# Patient Record
Sex: FEMALE | Race: WHITE | NOT HISPANIC OR LATINO | Employment: FULL TIME | URBAN - NONMETROPOLITAN AREA
[De-identification: names, ages, dates, MRNs, and addresses within clinical notes are randomized per-mention and may not be internally consistent; named-entity substitution may affect disease eponyms.]

---

## 2017-12-29 ENCOUNTER — TRANSFERRED RECORDS (OUTPATIENT)
Dept: HEALTH INFORMATION MANAGEMENT | Facility: OTHER | Age: 42
End: 2017-12-29

## 2018-06-13 ENCOUNTER — TRANSFERRED RECORDS (OUTPATIENT)
Dept: HEALTH INFORMATION MANAGEMENT | Facility: OTHER | Age: 43
End: 2018-06-13

## 2019-01-11 ENCOUNTER — TRANSFERRED RECORDS (OUTPATIENT)
Dept: HEALTH INFORMATION MANAGEMENT | Facility: OTHER | Age: 44
End: 2019-01-11

## 2019-06-21 ENCOUNTER — OFFICE VISIT (OUTPATIENT)
Dept: FAMILY MEDICINE | Facility: OTHER | Age: 44
End: 2019-06-21
Attending: FAMILY MEDICINE
Payer: COMMERCIAL

## 2019-06-21 VITALS
SYSTOLIC BLOOD PRESSURE: 118 MMHG | HEART RATE: 72 BPM | HEIGHT: 62 IN | TEMPERATURE: 98.5 F | BODY MASS INDEX: 27.6 KG/M2 | DIASTOLIC BLOOD PRESSURE: 80 MMHG | WEIGHT: 150 LBS | RESPIRATION RATE: 14 BRPM

## 2019-06-21 DIAGNOSIS — Z12.39 BREAST CANCER SCREENING, HIGH RISK PATIENT: ICD-10-CM

## 2019-06-21 DIAGNOSIS — Z12.31 ENCOUNTER FOR SCREENING MAMMOGRAM FOR BREAST CANCER: ICD-10-CM

## 2019-06-21 DIAGNOSIS — Z00.00 ROUTINE HISTORY AND PHYSICAL EXAMINATION OF ADULT: Primary | ICD-10-CM

## 2019-06-21 DIAGNOSIS — Z91.89 AT HIGH RISK FOR BREAST CANCER: ICD-10-CM

## 2019-06-21 PROCEDURE — 99396 PREV VISIT EST AGE 40-64: CPT | Performed by: FAMILY MEDICINE

## 2019-06-21 RX ORDER — CHOLECALCIFEROL (VITAMIN D3) 50 MCG
1 TABLET ORAL DAILY
COMMUNITY
Start: 2019-06-21

## 2019-06-21 ASSESSMENT — PAIN SCALES - GENERAL: PAINLEVEL: NO PAIN (0)

## 2019-06-21 ASSESSMENT — MIFFLIN-ST. JEOR: SCORE: 1293.65

## 2019-06-21 NOTE — NURSING NOTE
Patient presents to the clinic today for a px, and to establish care.     Yvonne Gao LPN.................. 6/21/2019 8:38 AM

## 2019-06-21 NOTE — PROGRESS NOTES
Nursing Notes:   Yvonne Gao LPN  6/21/2019  8:49 AM  Signed  Patient presents to the clinic today for a px, and to establish care.   Yvonne Gao LPN.................. 6/21/2019 8:38 AM     ANNUAL PHYSICAL - FEMALE  HPI: Yesy presents for a yearly exam.  Concerns include:  1. L knee/hip clicking/sore with running - stopped training for race and it has resolved.   2.  Discuss high risk for  breast cancer - needs follow up.  Has had a family history of breast cancer in her mom and maternal grandmother.  Mother was found to have breast cancer in her mid-30s; and passed away at age 39.  Grand mother was diagnosed around age 50.  Yesy has had BRCA testing in [facility] - which was negative.  Has been completing yearly mammograms and yearly breast MRIs (staggered, so some type of imaging being completed every 6 months).  Has had one biopsy of the L breast for area of concern, which returned benign.  She is hopeful to follow somewhere more local (even [place]) to reduce driving.  3. Boil?  On lower half of R breast, improved.  Was larger/red/swollen.  No drainage ever.  + tenderness when more swollen.  4. Recently missed a period - prior to this have ALWAYS been regular.   has had a vasectomy for years (but never did the follow up testing).  Does not feel like she is pregnant.    Patient's last menstrual period was 05/10/2019.   Contraception:  with vasectomy  Risk for STI?: No  Last pap: 5/2018; normal  Any hx of abnormal paps:  2015 had ASCUS with neg HPV.  Recheck in 2017 and 2018 were both normal.  FH ofearly CA?: Breast - as young as early 30s.    Cholesterol/DM concerns/screening: Completed within the last 2-3 years all normal.  Tobacco?: No  Calcium intake: No  DEXA: NA  Last mammo: see above.  Last mammogram was 1/11/2019; and last breast MRI was completed 6/13/2018.  A breast MRI will be ordered and a referral to Jeniffer Marshall with general surgery/breast surgery will be  placed.  Colonoscopy: @ 52  Immunizations: Tdap 9/3/2013; receives flu yearly.    Patient Active Problem List   Diagnosis     At high risk for breast cancer       Past Medical History:   Diagnosis Date     Atypical mole     removed from face and L collarbone     Palpitations 2018    SVT on monitoring.  stress, caffiene - improved.         Past Surgical History:   Procedure Laterality Date     BIOPSY OF SKIN LESION      atypical on face and L collarbone     BREAST BIOPSY, RT/LT Left      HERNIA REPAIR, UMBILICAL N/A ~2000       Social History     Socioeconomic History     Marital status:      Spouse name: Not on file     Number of children: Not on file     Years of education: Not on file     Highest education level: Not on file   Occupational History     Not on file   Social Needs     Financial resource strain: Not on file     Food insecurity:     Worry: Not on file     Inability: Not on file     Transportation needs:     Medical: Not on file     Non-medical: Not on file   Tobacco Use     Smoking status: Not on file   Substance and Sexual Activity     Alcohol use: Not on file     Drug use: Not on file     Sexual activity: Not on file   Lifestyle     Physical activity:     Days per week: Not on file     Minutes per session: Not on file     Stress: Not on file   Relationships     Social connections:     Talks on phone: Not on file     Gets together: Not on file     Attends Holiness service: Not on file     Active member of club or organization: Not on file     Attends meetings of clubs or organizations: Not on file     Relationship status: Not on file     Intimate partner violence:     Fear of current or ex partner: Not on file     Emotionally abused: Not on file     Physically abused: Not on file     Forced sexual activity: Not on file   Other Topics Concern     Not on file   Social History Narrative     Not on file       Family History   Problem Relation Age of Onset     Coronary Artery Disease Father          "s/p stent and bipass surgery; started at age 50     No Known Problems Brother         doesn't get medical care often     Current Outpatient Medications   Medication Sig Dispense Refill     vitamin D3 (CHOLECALCIFEROL) 2000 units (50 mcg) tablet Take 1 tablet (2,000 Units) by mouth daily        REVIEW OF SYSTEMS:  Refer to HPI; all other systems reviewed and negative.    PHYSICAL EXAM:  /80   Pulse 72   Temp 98.5  F (36.9  C) (Tympanic)   Resp 14   Ht 1.575 m (5' 2\")   Wt 68 kg (150 lb)   LMP 05/10/2019   Breastfeeding? No   BMI 27.44 kg/m    CONSTITUTIONAL:  Alert, cooperative, NAD.  EYES: No scleral icterus.  PERRLA.  Conjunctiva clear.  ENT/MOUTH: External ears and nose normal.  TMs normal.  Moist mucous membranes. Oropharynx clear.    ENDO: No thyromegaly or thyroid nodules.  LYMPH:  No cervical or supraclavicular LA.    BREASTS: No skin abnormalities, no erythema.  No discrete masses.  No nipple discharge, no axillary, supra- or infraclavicular LA.   CARDIOVASCULAR: Regular, S1, S2.  No S3 or S4.  No murmur/gallop/rub.  No peripheral edema.  RESPIRATORY: CTA bilaterally, no wheezes, rhonchi or rales.  GI: Bowel sounds wnl.  Soft, nontender, non-distended.  No masses or HSM.  No rebound or guarding.  Small (~3cm) midline healed surgical incision just cephalad to umbilicus.  : Vulva: normal, no lesions or discharge  Urethral meatus: normal size and location, no lesions or discharge  Urethra: no tenderness or masses  Bladder: no fullness or tenderness  Vagina: normal appearance, no abnormal discharge, no lesions.  No evidence of cystocele or rectocele.  Cervix: normal appearance, no lesions, no abnormal discharge.  Uterus: normal size and position, mobile, non-tender  Adnexa: nopalpable masses bilaterally. No cervical motion tenderness.  Pap smear obtained: Not due; NO.  MSKEL: Grossly normal ROM.  No clubbing.  INTEGUMENTARY:Warm, dry.  No rash noted on exposed skin.  NEUROLOGIC: Facies symmetric.  " Grossly normal movement and tone.  No tremor.  PSYCHIATRIC: Affect normal.  Speech fluent.      PHQ-2 Score:   PHQ-2 ( 1999 Pfizer) 6/21/2019   Q1: Little interest or pleasure in doing things 0   Q2: Feeling down, depressed or hopeless 0   PHQ-2 Score 0     Reviewed recent labs.    ASSESSMENT/PLAN:  1. Routine history and physical examination of adult    2. Encounter for screening mammogram for breast cancer    3. Breast cancer screening, high risk patient  - MR Breast Bilateral w/o & w Contrast; Future  - GENERAL SURG ADULT REFERRAL    4. At high risk for breast cancer    Relevant cancer screening discussed.    Counseled on healthy diet, Calcium and vitamin D intake, and exercise.    Fatoumata Puga, DO  Family Practice

## 2019-08-27 ENCOUNTER — HOSPITAL ENCOUNTER (OUTPATIENT)
Dept: MRI IMAGING | Facility: OTHER | Age: 44
Discharge: HOME | End: 2019-08-27
Attending: FAMILY MEDICINE | Admitting: FAMILY MEDICINE
Payer: COMMERCIAL

## 2019-08-27 DIAGNOSIS — Z12.39 BREAST CANCER SCREENING, HIGH RISK PATIENT: ICD-10-CM

## 2019-08-27 PROCEDURE — 77049 MRI BREAST C-+ W/CAD BI: CPT

## 2019-08-27 PROCEDURE — A9577 INJ MULTIHANCE: HCPCS | Performed by: FAMILY MEDICINE

## 2019-08-27 PROCEDURE — 25500064 ZZH RX 255 OP 636: Performed by: FAMILY MEDICINE

## 2019-08-27 RX ADMIN — GADOBENATE DIMEGLUMINE 15 ML: 529 INJECTION, SOLUTION INTRAVENOUS at 13:18

## 2019-09-03 ENCOUNTER — OFFICE VISIT (OUTPATIENT)
Dept: SURGERY | Facility: OTHER | Age: 44
End: 2019-09-03
Attending: SURGERY
Payer: COMMERCIAL

## 2019-09-03 VITALS
BODY MASS INDEX: 29.08 KG/M2 | DIASTOLIC BLOOD PRESSURE: 60 MMHG | HEART RATE: 64 BPM | WEIGHT: 158 LBS | TEMPERATURE: 97.3 F | HEIGHT: 62 IN | SYSTOLIC BLOOD PRESSURE: 98 MMHG | RESPIRATION RATE: 16 BRPM

## 2019-09-03 DIAGNOSIS — Z91.89 AT HIGH RISK FOR BREAST CANCER: Primary | ICD-10-CM

## 2019-09-03 DIAGNOSIS — Z80.3 FAMILY HISTORY OF BREAST CANCER IN MOTHER: ICD-10-CM

## 2019-09-03 PROCEDURE — 99203 OFFICE O/P NEW LOW 30 MIN: CPT | Performed by: SURGERY

## 2019-09-03 ASSESSMENT — PAIN SCALES - GENERAL: PAINLEVEL: NO PAIN (0)

## 2019-09-03 ASSESSMENT — MIFFLIN-ST. JEOR: SCORE: 1329.93

## 2019-09-03 NOTE — PROGRESS NOTES
OFFICE CONSULTATION NOTE  Patient Name: Yesy Garg  Address: [address] [place] [address]  Age:44 year old  Sex: female     Primary Care Physician: Dr. Puga    I was requested to see this patient in consultation by Dr. Puga for evaluation of surveillance for breast cancer. A copy of this note will be sent to Dr. Puga.    HPI:   The patient is 44 year old female with significant family history of breast cancer in her mother (Age 34) and her maternal grandmother Age 54 and 66. The patient has been undergoing high risk surveillance because of family history and dense breast tissue. She has been getting MRI and mammograms alternating every 6 months at [facility]. She recently noted a right breast skin cyst or maybe a spider bite. That resolved but the skin in the area is still dark. No  nipple or breast changes. No previous breast cancer. Previous breast biopsy in 2014 with benign findings. Recent MRI done 8/27 with no area of suspicious enhancement-left sided clip from previous biopsy noted.    CONSULTATION ASSESSMENT AND PLAN/RECOMMENDATIONS:   I discussed with the patient the current recommendations for surveillance for breast cancer in high risk patients (> 20% lifetime risk). We discussed that at this time the best evidence we have is for the current plan-alternating mammograms and MRI. She has previously discussed Tamoxifen as a method of risk reduction and isn't interested in that at this time. She will continue with current plan and will call with any concerns prior to June mammograms. She denies further questions at this time.    REVIEW OF SYSTEMS  GENERAL: No fevers or chills. Denies fatigue, recent weight loss.  HEENT: No sinus drainage. No changes with vision or hearing. No difficulty swallowing.   LYMPHATICS:  No swollen nodes in axilla, neck or groin.  CARDIOVASCULAR: Denies chest pain, palpitations and dyspnea on exertion.  PULMONARY: No shortness of breath or cough. No increase in  sputum production.  GI: Denies melena, bright red blood in stools. No hematemesis. No constipation or diarrhea.  : No dysuria or hematuria.  SKIN: No recent rashes or ulcers.   HEMATOLOGY:  No history of easy bruising or bleeding.  ENDOCRINE:  No history of diabetes or thyroid problems.  NEUROLOGY:  No history of seizures or headaches. No motor or sensory changes.  BREAST:  Denies breast pain or changes other than skin cyst or bite as above.    PAST MEDICAL HISTORY    Past Medical History:   Diagnosis Date     Atypical mole     removed from face and L collarbone     Palpitations 2018    SVT on monitoring.  stress, caffiene - improved.       PAST SURGICAL HISTORY    Past Surgical History:   Procedure Laterality Date     BIOPSY OF SKIN LESION      atypical on face and L collarbone     BREAST BIOPSY, RT/LT Left      HERNIA REPAIR, UMBILICAL N/A ~2000     CURRENT MEDS    Current Outpatient Medications   Medication     vitamin D3 (CHOLECALCIFEROL) 2000 units (50 mcg) tablet     No current facility-administered medications for this visit.      ALLERGIES/SENSITIVITIES    Allergies   Allergen Reactions     Augmentin Hives     FAMILY HISTORY    Family History   Problem Relation Age of Onset     Breast Cancer Mother 34     Coronary Artery Disease Father         s/p stent and bipass surgery; started at age 50     No Known Problems Brother         doesn't get medical care often     Breast Cancer Maternal Grandmother 54        and at 66     SOCIAL HISTORY  Social History     Socioeconomic History     Marital status:      Spouse name: None     Number of children: None     Years of education: None     Highest education level: None   Occupational History     None   Social Needs     Financial resource strain: None     Food insecurity:     Worry: None     Inability: None     Transportation needs:     Medical: None     Non-medical: None   Tobacco Use     Smoking status: Former Smoker     Smokeless tobacco: Never Used  "  Substance and Sexual Activity     Alcohol use: Yes     Comment: a couple drinks a week     Drug use: Never     Sexual activity: Yes     Partners: Male   Lifestyle     Physical activity:     Days per week: None     Minutes per session: None     Stress: None   Relationships     Social connections:     Talks on phone: None     Gets together: None     Attends Rastafarian service: None     Active member of club or organization: None     Attends meetings of clubs or organizations: None     Relationship status: None     Intimate partner violence:     Fear of current or ex partner: None     Emotionally abused: None     Physically abused: None     Forced sexual activity: None   Other Topics Concern     None   Social History Narrative     RN in the ICU       The above history was reviewed today, 9/3/2019.  PHYSICAL EXAM  BP 98/60 (BP Location: Right arm, Patient Position: Sitting, Cuff Size: Adult Regular)   Pulse 64   Temp 97.3  F (36.3  C) (Tympanic)   Resp 16   Ht 1.575 m (5' 2\")   Wt 71.7 kg (158 lb)   BMI 28.90 kg/m    Body mass index is 28.9 kg/m .    GENERAL: Healthy appearing patient in no acute distress. Pleasant and cooperative with exam and interview.   HEENT: Head-normocephalic. Eyes-no scleral icterus, pupils equal, round, and reactive to light. Nose-no nasal drainage. No lesions. Mouth-oral mucosa pink and moist, no lesions.  NECK: Supple. No thyroid nodules. Trachea midline.  LYMPHATICS:  No cervical, axillary or supraclavicular adenopathy.  CV: Regular rate and rhythm, no murmurs. No peripheral edema.  LUNGS:  No respiratory distress. Clear bilaterally to auscultation.  ABDOMEN: Non distended. Bowel sounds active. Soft, non-tender, no hepatosplenomegaly or hernias. No peritoneal signs.  SKIN: Pink, warm and dry. No jaundice. No rash.  NEURO:  Cranial nerves II-XII grossly intact. Alert and oriented.  PSYCH: Appropriate mood and affect.  BREAST: Breasts were examined in the seated and supine position. No " mass noted bilaterally. No nipple changes or discharge bilaterally. 1.5 cm area of hyperpigmentation right breast 4 o'clock position. No mass or induration. Appropriate tenderness noted bilateral breasts.    IMAGING/LAB  I personally reviewed patient's recent MRI images and report.

## 2019-09-03 NOTE — PATIENT INSTRUCTIONS
January mammograms, July MRI continue to alternate until further evidence for improved surveillance.

## 2019-09-03 NOTE — PROGRESS NOTES
"  How many children do you have? 2  What age did your menstrual cycle start? 15  How old were you when your first child was born? 21  Did you breast feed? yes  Are you on or have you ever taken any hormone replacement or birth control? Yes, birth control  Do you have a family history of breast cancer? Yes, mom and maternal grandmother  Chuyita Bowie LPN..........9/3/2019  11:19 AM    Chief Complaint   Patient presents with     Consult     follow up bilateral breast MR       Initial BP 98/60 (BP Location: Right arm, Patient Position: Sitting, Cuff Size: Adult Regular)   Pulse 64   Temp 97.3  F (36.3  C) (Tympanic)   Resp 16   Ht 1.575 m (5' 2\")   Wt 71.7 kg (158 lb)   BMI 28.90 kg/m   Estimated body mass index is 28.9 kg/m  as calculated from the following:    Height as of this encounter: 1.575 m (5' 2\").    Weight as of this encounter: 71.7 kg (158 lb).  Medication Reconciliation: complete    Chuyita Bowie LPN    "

## 2019-12-16 ENCOUNTER — HOSPITAL ENCOUNTER (OUTPATIENT)
Dept: GENERAL RADIOLOGY | Facility: OTHER | Age: 44
Discharge: HOME | End: 2019-12-16
Attending: PHYSICIAN ASSISTANT | Admitting: PHYSICIAN ASSISTANT
Payer: COMMERCIAL

## 2019-12-16 ENCOUNTER — OFFICE VISIT (OUTPATIENT)
Dept: FAMILY MEDICINE | Facility: OTHER | Age: 44
End: 2019-12-16
Attending: PHYSICIAN ASSISTANT
Payer: COMMERCIAL

## 2019-12-16 VITALS
HEART RATE: 68 BPM | TEMPERATURE: 97.2 F | BODY MASS INDEX: 29.26 KG/M2 | WEIGHT: 160 LBS | SYSTOLIC BLOOD PRESSURE: 134 MMHG | DIASTOLIC BLOOD PRESSURE: 72 MMHG | RESPIRATION RATE: 16 BRPM

## 2019-12-16 DIAGNOSIS — M79.674 PAIN OF TOE OF RIGHT FOOT: Primary | ICD-10-CM

## 2019-12-16 DIAGNOSIS — M79.674 PAIN OF TOE OF RIGHT FOOT: ICD-10-CM

## 2019-12-16 PROBLEM — R00.2 PALPITATIONS: Status: ACTIVE | Noted: 2018-05-30

## 2019-12-16 PROCEDURE — 99214 OFFICE O/P EST MOD 30 MIN: CPT | Performed by: PHYSICIAN ASSISTANT

## 2019-12-16 PROCEDURE — 73630 X-RAY EXAM OF FOOT: CPT | Mod: RT

## 2019-12-16 ASSESSMENT — PATIENT HEALTH QUESTIONNAIRE - PHQ9
SUM OF ALL RESPONSES TO PHQ QUESTIONS 1-9: 0
5. POOR APPETITE OR OVEREATING: NOT AT ALL

## 2019-12-16 ASSESSMENT — ANXIETY QUESTIONNAIRES
GAD7 TOTAL SCORE: 0
6. BECOMING EASILY ANNOYED OR IRRITABLE: NOT AT ALL
3. WORRYING TOO MUCH ABOUT DIFFERENT THINGS: NOT AT ALL
IF YOU CHECKED OFF ANY PROBLEMS ON THIS QUESTIONNAIRE, HOW DIFFICULT HAVE THESE PROBLEMS MADE IT FOR YOU TO DO YOUR WORK, TAKE CARE OF THINGS AT HOME, OR GET ALONG WITH OTHER PEOPLE: NOT DIFFICULT AT ALL
1. FEELING NERVOUS, ANXIOUS, OR ON EDGE: NOT AT ALL
7. FEELING AFRAID AS IF SOMETHING AWFUL MIGHT HAPPEN: NOT AT ALL
5. BEING SO RESTLESS THAT IT IS HARD TO SIT STILL: NOT AT ALL
2. NOT BEING ABLE TO STOP OR CONTROL WORRYING: NOT AT ALL

## 2019-12-16 ASSESSMENT — PAIN SCALES - GENERAL: PAINLEVEL: SEVERE PAIN (6)

## 2019-12-16 NOTE — PROGRESS NOTES
"Nursing Notes:   Chuyita Royal LPN  12/16/2019  3:40 PM  Signed  Patient presents to the clinic for right foot pain for months, patient denies any trauma at this time.      Chief Complaint   Patient presents with     Musculoskeletal Problem       Initial /72 (BP Location: Right arm, Patient Position: Sitting, Cuff Size: Adult Regular)   Pulse 68   Temp 97.2  F (36.2  C) (Tympanic)   Resp 16   Wt 72.6 kg (160 lb)   LMP 11/27/2019   BMI 29.26 kg/m    Estimated body mass index is 29.26 kg/m  as calculated from the following:    Height as of 9/3/19: 1.575 m (5' 2\").    Weight as of this encounter: 72.6 kg (160 lb).  Medication Reconciliation: complete    Chuyita Royal LPN      HPI:    Yesy Garg is a 44 year old female who presents for right foot pain for months, patient denies any trauma at this time.  Second toe pain aching with movement. Radiates to bottom of foot. No trauma. Worse with walking, especially on hard surfaces. Wearing slippers at home.     Past Medical History:   Diagnosis Date     Atypical mole     removed from face and L collarbone     Palpitations 2018    SVT on monitoring.  stress, caffiene - improved.         Past Surgical History:   Procedure Laterality Date     BIOPSY OF SKIN LESION      atypical on face and L collarbone     BREAST BIOPSY, RT/LT Left      HERNIA REPAIR, UMBILICAL N/A ~2000       Family History   Problem Relation Age of Onset     Breast Cancer Mother 34     Coronary Artery Disease Father         s/p stent and bipass surgery; started at age 50     No Known Problems Brother         doesn't get medical care often     Breast Cancer Maternal Grandmother 54        and at 66       Social History     Tobacco Use     Smoking status: Former Smoker     Smokeless tobacco: Never Used   Substance Use Topics     Alcohol use: Yes     Comment: a couple drinks a week       Current Outpatient Medications   Medication Sig Dispense Refill     vitamin D3 (CHOLECALCIFEROL) 2000 " units (50 mcg) tablet Take 1 tablet (2,000 Units) by mouth daily         Allergies   Allergen Reactions     Augmentin Hives       REVIEW OF SYSTEMS:  Refer to HPI.    EXAM:   Vitals:    /72 (BP Location: Right arm, Patient Position: Sitting, Cuff Size: Adult Regular)   Pulse 68   Temp 97.2  F (36.2  C) (Tympanic)   Resp 16   Wt 72.6 kg (160 lb)   LMP 11/27/2019   BMI 29.26 kg/m      General Appearance: Pleasant, alert, appropriate appearance for age. No acute distress  Musculoskeletal Exam: Second distal metatarsal pain with palpation.  No bruising or swelling appreciated.  Minimal toe pain with flexion and extension.  Foot Exam: Left and right foot: good pedal pulses, no lesions, nail hygiene good.  Skin: no rash or abnormalities  Neurologic Exam: Nonfocal, normal gross motor, tone coordination and no tremor.  Psychiatric Exam: Alert and oriented -appropriate affect.    PHQ Depression Screen  PHQ-9 SCORE 12/16/2019   PHQ-9 Total Score 0       ASSESSMENT AND PLAN:      ICD-10-CM    1. Pain of toe of right foot M79.674 XR Foot Right G/E 3 Views         Completed right foot xray.  I personally reviewed the xray. I found no fracture appreciated upon initial read of xray.  Final read pending by radiology.   Encouraged to take ibuprofen for relief up to 4 times per day.  Encouraged rest and elevation.  Encouraged to use ice or heat 15 minutes at a time several times per day to decrease pain. Return to clinic in 1-2 weeks as necessary for persistent pain. Return to clinic with any change or worsening of symptoms.   Encouraged ankle ABCs.   Encouraged shoes with good arch support.        Patient Instructions   Encouraged to take ibuprofen for relief up to 4 times per day.  Encouraged rest and elevation.  Encouraged to use ice or heat 15 minutes at a time several times per day to decrease pain. Return to clinic in 1-2 weeks as necessary for persistent pain. Return to clinic with any change or worsening of  symptoms.   Encouraged ankle ABCs.   Encouraged shoes with good arch support.        Leny Hull PA-C PA-C..................12/16/2019 8:33 AM

## 2019-12-16 NOTE — PATIENT INSTRUCTIONS
Encouraged to take ibuprofen for relief up to 4 times per day.  Encouraged rest and elevation.  Encouraged to use ice or heat 15 minutes at a time several times per day to decrease pain. Return to clinic in 1-2 weeks as necessary for persistent pain. Return to clinic with any change or worsening of symptoms.   Encouraged ankle ABCs.   Encouraged shoes with good arch support.

## 2019-12-16 NOTE — NURSING NOTE
"Patient presents to the clinic for right foot pain for months, patient denies any trauma at this time.      Chief Complaint   Patient presents with     Musculoskeletal Problem       Initial /72 (BP Location: Right arm, Patient Position: Sitting, Cuff Size: Adult Regular)   Pulse 68   Temp 97.2  F (36.2  C) (Tympanic)   Resp 16   Wt 72.6 kg (160 lb)   LMP 11/27/2019   BMI 29.26 kg/m   Estimated body mass index is 29.26 kg/m  as calculated from the following:    Height as of 9/3/19: 1.575 m (5' 2\").    Weight as of this encounter: 72.6 kg (160 lb).  Medication Reconciliation: complete    Chuyita Royal LPN    "

## 2019-12-17 ASSESSMENT — ANXIETY QUESTIONNAIRES: GAD7 TOTAL SCORE: 0

## 2020-02-03 ENCOUNTER — HOSPITAL ENCOUNTER (EMERGENCY)
Facility: OTHER | Age: 45
Discharge: HOME | End: 2020-02-03
Attending: EMERGENCY MEDICINE | Admitting: EMERGENCY MEDICINE
Payer: COMMERCIAL

## 2020-02-03 ENCOUNTER — APPOINTMENT (OUTPATIENT)
Dept: GENERAL RADIOLOGY | Facility: OTHER | Age: 45
End: 2020-02-03
Attending: EMERGENCY MEDICINE
Payer: COMMERCIAL

## 2020-02-03 VITALS
HEART RATE: 93 BPM | BODY MASS INDEX: 27.6 KG/M2 | WEIGHT: 150 LBS | RESPIRATION RATE: 15 BRPM | HEIGHT: 62 IN | DIASTOLIC BLOOD PRESSURE: 58 MMHG | SYSTOLIC BLOOD PRESSURE: 99 MMHG | OXYGEN SATURATION: 100 % | TEMPERATURE: 98 F

## 2020-02-03 DIAGNOSIS — S82.831A CLOSED FRACTURE OF DISTAL END OF RIGHT FIBULA: ICD-10-CM

## 2020-02-03 DIAGNOSIS — S82.831A CLOSED FRACTURE OF DISTAL END OF RIGHT FIBULA, UNSPECIFIED FRACTURE MORPHOLOGY, INITIAL ENCOUNTER: ICD-10-CM

## 2020-02-03 PROCEDURE — 73610 X-RAY EXAM OF ANKLE: CPT | Mod: RT

## 2020-02-03 PROCEDURE — 99283 EMERGENCY DEPT VISIT LOW MDM: CPT | Mod: Z6 | Performed by: EMERGENCY MEDICINE

## 2020-02-03 PROCEDURE — 99283 EMERGENCY DEPT VISIT LOW MDM: CPT | Performed by: EMERGENCY MEDICINE

## 2020-02-03 RX ORDER — HYDROCODONE BITARTRATE AND ACETAMINOPHEN 5; 325 MG/1; MG/1
1 TABLET ORAL EVERY 6 HOURS PRN
Qty: 18 TABLET | Refills: 0 | Status: SHIPPED | OUTPATIENT
Start: 2020-02-03 | End: 2021-07-14

## 2020-02-03 ASSESSMENT — ENCOUNTER SYMPTOMS
JOINT SWELLING: 1
RESPIRATORY NEGATIVE: 1
PSYCHIATRIC NEGATIVE: 1
CARDIOVASCULAR NEGATIVE: 1
NEUROLOGICAL NEGATIVE: 1
GASTROINTESTINAL NEGATIVE: 1
CONSTITUTIONAL NEGATIVE: 1
EYES NEGATIVE: 1

## 2020-02-03 ASSESSMENT — MIFFLIN-ST. JEOR: SCORE: 1293.65

## 2020-02-03 NOTE — LETTER
[facility]  [address]  [place] [address]  Phone: [phone]  Fax: [phone]    February 3, 2020        Yesy DIANN Nielsen[place]  [address] [place] [address]          To whom it may concern:    RE: Yesybharath Nielsenollum will need some time off of work due to injury.  She should follow-up with orthopedics for work release.    Please contact me for questions or concerns.      Sincerely,        Juan Carlos Michael MD  2/3/2020, 9:40 AM

## 2020-02-03 NOTE — DISCHARGE INSTRUCTIONS
Follow-up with orthopedics later this week.  Use ice elevation and ibuprofen as needed for pain.  Also use Tylenol.

## 2020-02-03 NOTE — ED TRIAGE NOTES
"ED Nursing Triage Note (General)   ________________________________    Yesy Garg is a 44 year old Female that presents to triage private car  With history of  Fall on ice last night injuring her right ankle; unable to bear weight reported by patient   Significant symptoms had onset 12 hour(s) ago.  BP 99/58   Pulse 93   Temp 98  F (36.7  C) (Temporal)   Resp 15   Ht 1.575 m (5' 2\")   Wt 68 kg (150 lb)   SpO2 100%   BMI 27.44 kg/m  t  Patient appears alert , in moderate distress., and cooperative behavior.    GCS Total = 15  Airway: intact  Breathing noted as Normal.  Circulation Normal  Skin normal  Action taken:  Triage order initiated      PRE HOSPITAL PRIOR LIVING SITUATION Spouse  "

## 2020-02-03 NOTE — ED AVS SNAPSHOT
[facility]  [address]  [place] [address]  Phone:  [phone]  Fax:  [phone]                                    Yesy Garg   MRN: [id number]    Department:  [facility]   Date of Visit:  2/3/2020           After Visit Summary Signature Page    I have received my discharge instructions, and my questions have been answered. I have discussed any challenges I see with this plan with the nurse or doctor.    ..........................................................................................................................................  Patient/Patient Representative Signature      ..........................................................................................................................................  Patient Representative Print Name and Relationship to Patient    ..................................................               ................................................  Date                                   Time    ..........................................................................................................................................  Reviewed by Signature/Title    ...................................................              ..............................................  Date                                               Time          22EPIC Rev 08/18

## 2020-02-03 NOTE — ED PROVIDER NOTES
History     Chief Complaint   Patient presents with     Trauma     HPI  Yesy Garg is a 44 year old female who presents to the ED due to fall on ice last night. She said she was walking outside when she fell on her right ankle. She notes she is unable to bear weight and has not bore weight since the accident. She also injured her right hand but did not hit her head. The fall was unwitnessed but she remembers everything and claims she did not black out. She has never fractured that ankle before. She is not on any blood thinners and is only taking vitamins. She has not taken anything for pain.     Allergies:  Allergies   Allergen Reactions     Augmentin Hives       Problem List:    Patient Active Problem List    Diagnosis Date Noted     Closed fracture of distal end of right fibula 02/03/2020     Priority: Medium     At high risk for breast cancer 06/21/2019     Priority: Medium     Palpitations 05/30/2018     Priority: Medium        Past Medical History:    Past Medical History:   Diagnosis Date     Atypical mole      Palpitations 2018       Past Surgical History:    Past Surgical History:   Procedure Laterality Date     BIOPSY OF SKIN LESION      atypical on face and L collarbone     BREAST BIOPSY, RT/LT Left      HERNIA REPAIR, UMBILICAL N/A ~2000       Family History:    Family History   Problem Relation Age of Onset     Breast Cancer Mother 34     Coronary Artery Disease Father         s/p stent and bipass surgery; started at age 50     No Known Problems Brother         doesn't get medical care often     Breast Cancer Maternal Grandmother 54        and at 66       Social History:  Marital Status:   [2]  Social History     Tobacco Use     Smoking status: Former Smoker     Smokeless tobacco: Never Used   Substance Use Topics     Alcohol use: Yes     Comment: a couple drinks a week     Drug use: Never        Medications:    vitamin D3 (CHOLECALCIFEROL) 2000 units (50 mcg) tablet          Review of  "Systems   Constitutional: Negative.    HENT: Negative.    Eyes: Negative.    Respiratory: Negative.    Cardiovascular: Negative.    Gastrointestinal: Negative.    Musculoskeletal: Positive for joint swelling.        Right ankle swelling and pain   Neurological: Negative.    Psychiatric/Behavioral: Negative.        Physical Exam   BP: 99/58  Pulse: 93  Temp: 98  F (36.7  C)  Resp: 15  Height: 157.5 cm (5' 2\")  Weight: 68 kg (150 lb)  SpO2: 100 %      Physical Exam  Constitutional:       Appearance: Normal appearance. She is normal weight.   HENT:      Mouth/Throat:      Mouth: Mucous membranes are moist.      Pharynx: Oropharynx is clear.   Cardiovascular:      Rate and Rhythm: Normal rate and regular rhythm.   Pulmonary:      Effort: Pulmonary effort is normal.      Breath sounds: Normal breath sounds.   Musculoskeletal:         General: Swelling, tenderness and signs of injury present.      Comments: Injury of her right ankle. Edematous around the lateral malleolus. Tender to palpation at lateral malleolus. Is not able to bear weight.      Skin:     General: Skin is warm and dry.      Findings: Bruising present.      Comments: Has a small hematoma on right palm.    Neurological:      General: No focal deficit present.      Mental Status: She is alert.         ED Course        Procedures               Critical Care time:  none               Results for orders placed or performed during the hospital encounter of 02/03/20 (from the past 24 hour(s))   XR Ankle Right G/E 3 Views    Narrative    PROCEDURE: XR ANKLE RT G/E 3 VW 2/3/2020 9:18 AM    HISTORY: fall unable to bear weight    COMPARISONS: None.    TECHNIQUE: 3 views.    FINDINGS: There is an obliquely oriented fracture through the distal  fibula with some cortical angulation seen only along the posterior  margin of the fracture line. No significant displacement is seen.  There is lateral soft tissue swelling.    No medial malleolar fracture is seen and ankle " mortise is congruent.         Impression    IMPRESSION: Distal fibular fracture without significant displacement.    REAGAN BURNS MD     Ankle Xray: Distal fibular fracture without displacement.     Medications - No data to display    Assessments & Plan (with Medical Decision Making)     I have reviewed the nursing notes.    I have reviewed the findings, diagnosis, plan and need for follow up with the patient.         New Prescriptions    No medications on file       Final diagnoses:   Closed fracture of distal end of right fibula     1. Acute fracture of distal right fibula  Patient presents after fall last night. She has been unable to bear weight since the fall. Notes she did not hit her head. Pain is a 4/10, has iced the leg but has not taken any medications. Xray of right ankle showed hairline fracture of distal right fibula.   Plan is to discharge her with cast boot and crutches. Ice ankle, rest and NSAIDs or tylenol as needed. Follow up with orthopedics in a few days.     Patsy Douglas MS3, [employer] student   Working under the supervision of Dr. Edwar Michael MD      2/3/2020   [facility]      Note started by MS3 [employer],  Irevised, edited and addended note as needed.  In addition patient was seen and examined by myself including both history and physical examination. My findings are reflected in the note above.           Juan Carlos Michael MD  02/03/20 [id number]

## 2020-02-06 ENCOUNTER — OFFICE VISIT (OUTPATIENT)
Dept: FAMILY MEDICINE | Facility: OTHER | Age: 45
End: 2020-02-06
Attending: PHYSICIAN ASSISTANT
Payer: COMMERCIAL

## 2020-02-06 ENCOUNTER — OFFICE VISIT (OUTPATIENT)
Dept: ORTHOPEDICS | Facility: OTHER | Age: 45
End: 2020-02-06
Attending: PODIATRIST
Payer: COMMERCIAL

## 2020-02-06 ENCOUNTER — HOSPITAL ENCOUNTER (OUTPATIENT)
Dept: GENERAL RADIOLOGY | Facility: OTHER | Age: 45
Discharge: HOME | End: 2020-02-06
Attending: PODIATRIST | Admitting: PODIATRIST
Payer: COMMERCIAL

## 2020-02-06 VITALS
DIASTOLIC BLOOD PRESSURE: 62 MMHG | BODY MASS INDEX: 27.25 KG/M2 | RESPIRATION RATE: 18 BRPM | WEIGHT: 149 LBS | OXYGEN SATURATION: 98 % | HEART RATE: 74 BPM | SYSTOLIC BLOOD PRESSURE: 90 MMHG | TEMPERATURE: 99.1 F

## 2020-02-06 DIAGNOSIS — S82.831A CLOSED FRACTURE OF DISTAL END OF RIGHT FIBULA, UNSPECIFIED FRACTURE MORPHOLOGY, INITIAL ENCOUNTER: ICD-10-CM

## 2020-02-06 DIAGNOSIS — S82.891D CLOSED FRACTURE OF RIGHT ANKLE WITH ROUTINE HEALING, SUBSEQUENT ENCOUNTER: ICD-10-CM

## 2020-02-06 DIAGNOSIS — Z01.818 PREOP GENERAL PHYSICAL EXAM: Primary | ICD-10-CM

## 2020-02-06 DIAGNOSIS — S82.891D CLOSED FRACTURE OF RIGHT ANKLE WITH ROUTINE HEALING, SUBSEQUENT ENCOUNTER: Primary | ICD-10-CM

## 2020-02-06 LAB
HCG UR QL: NEGATIVE
HGB BLD-MCNC: 14.7 G/DL (ref 11.7–15.7)

## 2020-02-06 PROCEDURE — 81025 URINE PREGNANCY TEST: CPT | Mod: ZL | Performed by: PHYSICIAN ASSISTANT

## 2020-02-06 PROCEDURE — 99214 OFFICE O/P EST MOD 30 MIN: CPT | Performed by: PHYSICIAN ASSISTANT

## 2020-02-06 PROCEDURE — 73600 X-RAY EXAM OF ANKLE: CPT | Mod: RT,52

## 2020-02-06 PROCEDURE — G0463 HOSPITAL OUTPT CLINIC VISIT: HCPCS | Mod: 25

## 2020-02-06 PROCEDURE — 36415 COLL VENOUS BLD VENIPUNCTURE: CPT | Mod: ZL | Performed by: PHYSICIAN ASSISTANT

## 2020-02-06 PROCEDURE — 85018 HEMOGLOBIN: CPT | Mod: ZL | Performed by: PHYSICIAN ASSISTANT

## 2020-02-06 NOTE — PROGRESS NOTES
----------------- PREOPERATIVE EXAM ------------------  2/6/2020    SUBJECTIVE:  Yesy Garg is a 44 year old female here for preoperative optimization.    I was asked to see Yesy Garg by Dr. Foster for a preoperative optimization due to general health and anesthesia risk.     Patient has a history of a left closed fracture of the right ankle. She fell on ice on 2/2/2020. Is non-weightbearing, wearing a boot and using crutches now. Was given Norco in the ER, only taking it at night. Is managing pain with 800 mg ibuprofen every 6 hours.     She started having a non-productive cough on 2/2/2020. Has felt a small amount of wheezing in her throat. Had mild congestion last night, took a nyquil at night for relief. No fevers, chills, nausea, vomiting, sinus pain, ear pain, SOB or difficulty breathing.     Patient has tolerated surgery well in the past.  No acute concerns at this time.  No chest pain, palpitations, problems breathing, stomachaches, nausea, vomiting, diarrhea, constipation, blood in stool or urine, dysuria, frequency, urgency.  No swelling of her hands or feet.    Nursing Notes:   Carolyn Hill LPN  2/6/2020  2:15 PM  Signed  Date of Surgery: 2/7/20  Type of Surgery: Right ankle  Surgeon: Dr. Foster  Hospital:  [place]  Fax:     Fever/Chills or other infectious symptoms in past month: URI  >10lb weight loss in past two months: NO  O2 SAT: 98    Health Care Directive/Code status:  NO  Hx of blood transfusions:    NO  Td up to date:  2013  History of VRE/MRSA:  NO Date:    Preoperative Evaluation: Obstructive Sleep Apnea screening    S: Snore -  Do you snore loudly? (louder than talking or loud enough to be heard through closed doors)NO  T: Tired - Do you often feel tired, fatigued, or sleepy during the daytime?NO  O: Observed - Has anyone ever observed you stop breathing during your sleep?NO  P: Pressure - Do you have or are you being treated for high blood pressure?NO  B: BMI -  "BMI greater than 35kg/m2?No  A: Age - Age over 50 years old?NO  N: Neck - Neck circumference greater than 40 cm?NO  G: Gender - Gender: Male?No    Total number of \"YES\" responses:  0      Scoring: Low risk of DONNA 0-2  At Risk of DONNA: >3 High Risk of DONNA: 5-8    Gay Hill KALEE ...... 2/6/2020 2:03 PM        Patient Active Problem List    Diagnosis Date Noted     Closed fracture of distal end of right fibula 02/03/2020     Priority: Medium     At high risk for breast cancer 06/21/2019     Priority: Medium     Palpitations 05/30/2018     Priority: Medium       Past Medical History:   Diagnosis Date     Atypical mole     removed from face and L collarbone     Palpitations 2018    SVT on monitoring.  stress, caffiene - improved.         Past Surgical History:   Procedure Laterality Date     BIOPSY OF SKIN LESION      atypical on face and L collarbone     BREAST BIOPSY, RT/LT Left      HERNIA REPAIR, UMBILICAL N/A ~2000       Current Outpatient Medications   Medication Sig Dispense Refill     HYDROcodone-acetaminophen (NORCO) 5-325 MG tablet Take 1 tablet by mouth every 6 hours as needed for pain 18 tablet 0     vitamin D3 (CHOLECALCIFEROL) 2000 units (50 mcg) tablet Take 1 tablet (2,000 Units) by mouth daily         Recent use of ibuprofen, aspirin or aleve: Yes     Allergies:  Allergies   Allergen Reactions     Augmentin Hives       Latex allergy  No    Family History   Problem Relation Age of Onset     Breast Cancer Mother 34     Coronary Artery Disease Father         s/p stent and bipass surgery; started at age 50     No Known Problems Brother         doesn't get medical care often     Breast Cancer Maternal Grandmother 54        and at 66       Denies family hx of bleeding tendencies, anesthesia complications, or other problems with surgery.  none    Social History     Tobacco Use     Smoking status: Former Smoker     Smokeless tobacco: Never Used   Substance Use Topics     Alcohol use: Yes     Comment: a " couple drinks a week         ROS:    surgical:  patient denies previous complications from prior surgeries including but not limited to prolonged bleeding, anesthesia complications, dysrhythmias, surgical wound infections, or prolonged hospital stay.      REVIEW OF SYSTEMS:  A comprehensive review of systems was negative except foritems noted in HPI/Subjective.    Constitutional: Negative for fever, chills and fatigue .   Eyes: Negative for irritation  and redness .  Ears, nose, mouth, throat, and face: Negative for ear drainage, nasal congestion, sore throat and hoarseness .  Respiratory: Negative for sputum production , hemoptysis, dyspnea  and wheezing .  Cardiovascular: Negative for chest discomfort, palpitations and lightheadedness .  Gastrointestinal: Negative for dysphagia, nausea, vomiting, melena, diarrhea, constipation and abdominal pain.  Genitourinary: Negative for frequency, dysuria, urinary incontinence, hematuria.  Integument/breast: Negative for rash.   Hematologic/lymphatic: Negative for easy bruising, bleeding, lymphadenopathy and petechiae.   Musculoskeletal: Negative for myalgias and arthralgias .  Neurological: Negative for headaches, dizziness, vertigo, seizures and weakness.   Psychiatric: Negative for anxiety, depression, panic attacks and suicidal ideations.       -------------------------------------------------------------    PHYSICAL EXAM:  BP 90/62 (BP Location: Right arm, Patient Position: Sitting, Cuff Size: Adult Large)   Pulse 74   Temp 99.1  F (37.3  C)   Resp 18   Wt 67.6 kg (149 lb)   LMP 01/13/2020   SpO2 98%   BMI 27.25 kg/m      EXAM:  General Appearance: Pleasant, alert, appropriate appearance for age. No acute distress  Ear Exam: Normal TM's bilaterally. Normal auditory canals and external ears. Non-tender.  Nose Exam: Normal external nose, mucus membranes, and septum.  OroPharynx Exam: Dental hygiene adequate. Normal buccal mucosa. Normal pharynx.  Neck Exam: Supple,  no masses or nodes., no lymphadenopathy  Chest/Respiratory Exam: Normal chest wall and respirations. Clear to auscultation.  Cardiovascular Exam: Regular rate and rhythm. S1, S2, no murmur, click, gallop, or rubs.  Gastrointestinal Exam: Soft, nontender, no abnormal masses or organomegaly. BS x 4.  Lymphatic Exam: Normal.  Musculoskeletal Exam: Back is straight and non-tender. Right ankle in immobilizer boot.   Skin: no rash or abnormalities.  Psychiatric Exam: Alert and oriented, appropriate affect.    PHQ Depression Screen  PHQ-9 SCORE 12/16/2019   PHQ-9 Total Score 0       Patient can walk up a flight of stairs without becoming short of breath or having chest pain: YES   Patient is able to tolerate greater than 4 METs of activity without any cardiopulmonary symptoms.    LABS:    Results for orders placed or performed in visit on 02/06/20   Pregnancy, Urine (HCG)     Status: None   Result Value Ref Range    HCG Qual Urine Negative NEG^Negative   Hemoglobin     Status: None   Result Value Ref Range    Hemoglobin 14.7 11.7 - 15.7 g/dL   Results for orders placed or performed during the hospital encounter of 02/06/20   XR Ankle Right 1 View     Status: None    Narrative    PROCEDURE: XR ANKLE RT 1 VW 2/6/2020 1:34 PM    HISTORY: Closed fracture of right ankle with routine healing,  subsequent encounter    COMPARISONS: 2/3/2020.    TECHNIQUE: 3 views.    FINDINGS: Oblique fracture through the distal fibula is again noted.  No other fracture is seen and ankle mortise appears congruent.         Impression    IMPRESSION: Minimally displaced fracture of the distal fibula.  Overlying soft tissue swelling.    REAGAN BURNS MD          CXR:  Not indicated    EKG: Not indicated    ---------------------------------------------------------------    No family history of problems with bleeding or anesthetia.    ASA PS class 1. Patient's perioperative risk is minimized and no further cardiopulmonary workup is neccesary.   Please contact the office with any questions or concerns.      ICD-10-CM    1. Preop general physical exam Z01.818 Pregnancy, Urine (HCG)     Hemoglobin     Hemoglobin   2. Closed fracture of distal end of right fibula, unspecified fracture morphology, initial encounter S82.831A Pregnancy, Urine (HCG)     Hemoglobin     Hemoglobin         ASSESSEMNT AND PLAN:  1.  Preoperative history and physical   consults:  None  Patient is approved for the surgery.  No concerns.     For above listed surgery and anesthesia:    - Patient is Low risk for perioperative complications.    Patient was administered the following vaccines today: none.  Completed labs today: hemoglobin and urine HCG, no concerns.    PRE OP RECOMMENDATIONS:  Patient is on chronic pain medications no   Patient is on antiplatlet/anticoagulation no   Other medications that need adjustment perioperatively no     Patient Instructions   Patient should take the following medications the morning of surgery with a small sip of water: hold all medications.  Patient was instructed to hold the following medications the morning of surgery: hold all medications.     Patient was advised to call our office and the surgical services with any change in condition or new symptoms if they were to develop between today and their surgical date.  Especially any cardiopulmonary symptoms or symptoms concerning for an infection.     Discontinue aspirin, aleve, naproxen and ibuprofen 7 days prior to surgery to reduce bleeding risk.  It is fine to take tylenol the week before surgery.  Hold vitamins and herbal remedies for 7 days before surgery.         Other:  Patient was advised to call our office and the surgical services with any change in condition or new symptoms if they were to develop between today and their surgical date.  Especially any cardiopulmonary symptoms or symptoms concerning for an infection.     PRE OP RECOMMENDATIONS:  Discontinue ASA 7 days prior to reduce  bleeding risk and Discontinue NSAIDS 7 days prior to procedure to reduce bleeding risk    Greater than 25 minutes were spent in counseling and coordination of care.    Leny Hull PA-C PA-C..................2/6/2020 2:12 PM

## 2020-02-06 NOTE — NURSING NOTE
"Date of Surgery: 2/7/20  Type of Surgery: Right ankle  Surgeon: Dr. Foster  Hospital:  [place]  Fax:     Fever/Chills or other infectious symptoms in past month: URI  >10lb weight loss in past two months: NO  O2 SAT: 98    Health Care Directive/Code status:  NO  Hx of blood transfusions:    NO  Td up to date:  2013  History of VRE/MRSA:  NO Date:    Preoperative Evaluation: Obstructive Sleep Apnea screening    S: Snore -  Do you snore loudly? (louder than talking or loud enough to be heard through closed doors)NO  T: Tired - Do you often feel tired, fatigued, or sleepy during the daytime?NO  O: Observed - Has anyone ever observed you stop breathing during your sleep?NO  P: Pressure - Do you have or are you being treated for high blood pressure?NO  B: BMI - BMI greater than 35kg/m2?No  A: Age - Age over 50 years old?NO  N: Neck - Neck circumference greater than 40 cm?NO  G: Gender - Gender: Male?No    Total number of \"YES\" responses:  0      Scoring: Low risk of DONNA 0-2  At Risk of DONNA: >3 High Risk of DONNA: 5-8    Gay Hill LPN ...... 2/6/2020 2:03 PM      "

## 2020-02-06 NOTE — PATIENT INSTRUCTIONS
Patient should take the following medications the morning of surgery with a small sip of water: hold all medications.  Patient was instructed to hold the following medications the morning of surgery: hold all medications.     Patient was advised to call our office and the surgical services with any change in condition or new symptoms if they were to develop between today and their surgical date.  Especially any cardiopulmonary symptoms or symptoms concerning for an infection.     Discontinue aspirin, aleve, naproxen and ibuprofen 7 days prior to surgery to reduce bleeding risk.  It is fine to take tylenol the week before surgery.  Hold vitamins and herbal remedies for 7 days before surgery.

## 2020-02-11 DIAGNOSIS — Z87.81 S/P ORIF (OPEN REDUCTION INTERNAL FIXATION) FRACTURE: Primary | ICD-10-CM

## 2020-02-11 DIAGNOSIS — Z98.890 S/P ORIF (OPEN REDUCTION INTERNAL FIXATION) FRACTURE: Primary | ICD-10-CM

## 2020-02-12 NOTE — PROGRESS NOTES
VITALS:   Height:   62  Weight:   149      CHIEF COMPLAINT: Right Ankle Injury, Fracture    PROBLEMS:   Patient has no noted problems.    PATIENT REPORTED MEDICATIONS:  DAILY MULTIVITAMIN ORAL CAPSULE (MULTIPLE VITAMINS-MINERALS) ; Route: ORAL    Medications were reviewed with patient this visit.    PATIENT REPORTED ALLERGIES:  AUGMENTIN (AMOXICILLIN-POT CLAVULANATE) (SevereReaction: No reaction details noted)    Allergies were reviewed during this visit.    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   She fell on Sunday and slipped on some ice, sustaining a fibular fracture.  It is long oblique fracture and most of her pain is lateral.   She has been nonweightbearing up to this point.   She is here today to discuss options.    PAST SURGICAL HISTORY:    Hernia Surgery    SOCIAL HISTORY:   Marital Status:    Occupation: [employer] Nurse, ER  Alcohol Use:  Yes  Tobacco Use:  Former  History HIV:  No  History Hepatitis:  No     PHYSICAL EXAMINATION:    CONSTITUTIONAL:  Patient is alert and oriented x3, well-appearing and in no apparent distress.  Affect is pleasant and answers questions appropriately.  VASCULAR:  Circulation is intact with palpable pedal pulses and has adequate capillary fill time.  NEUROLOGIC:  Light touch sensation is intact to digits.  INTEGUMENT:  No dermatologic lesions are noted.  Skin with normal texture and turgor.  MUSCULOSKELETAL:   Tenderness, swelling, and bruising to the lateral ankle, minimal tenderness medially.      X-RAY:  I did review x-rays.    She has long oblique fibular fracture with mild displacement.   I did stress this.  She did get further displacement of the fibula, mild gapping medial gutter, some instability here and she has over 3 mm displacement on the fibula and I recommend ORIF given the long oblique nature of this fracture.    ASSESSMENT:     Right ankle fibular fracture with some displacement.    PLAN:   Discussed condition and treatment with the patient today.  I recommend ORIF given the long oblique nature of this fracture.  We will plan on ORIF and get her weightbearing on this relatively quickly.  I discussed surgery, recovery, risks, potential complications, alternatives, and benefits in detail.  Again, we discussed potential for conservative management but given over 3 mm of gapping of the fibula in the frontal plane, I think ideally we will get this in for repositioning and allow her to weightbear early.    Dictated by Jatinder Foster D.P.M.  D:  02/06/2020  T:   02/06/2020    Typed and/or reviewed and corrected by signing  below, and sent to the Physician for final review and signature.     This report was created using voice recording software and computer-generated templates. Although every effort has been made to review for and eliminate errors, some errors may still occur.

## 2020-02-13 ENCOUNTER — OFFICE VISIT (OUTPATIENT)
Dept: ORTHOPEDICS | Facility: OTHER | Age: 45
End: 2020-02-13
Attending: PODIATRIST
Payer: COMMERCIAL

## 2020-02-13 ENCOUNTER — HOSPITAL ENCOUNTER (OUTPATIENT)
Dept: GENERAL RADIOLOGY | Facility: OTHER | Age: 45
Discharge: HOME | End: 2020-02-13
Attending: PODIATRIST | Admitting: PODIATRIST
Payer: COMMERCIAL

## 2020-02-13 DIAGNOSIS — Z98.890 S/P ORIF (OPEN REDUCTION INTERNAL FIXATION) FRACTURE: ICD-10-CM

## 2020-02-13 DIAGNOSIS — Z00.00 ROUTINE GENERAL MEDICAL EXAMINATION AT A HEALTH CARE FACILITY: Primary | ICD-10-CM

## 2020-02-13 DIAGNOSIS — Z87.81 S/P ORIF (OPEN REDUCTION INTERNAL FIXATION) FRACTURE: ICD-10-CM

## 2020-02-13 PROCEDURE — 99024 POSTOP FOLLOW-UP VISIT: CPT | Performed by: PODIATRIST

## 2020-02-13 PROCEDURE — 73610 X-RAY EXAM OF ANKLE: CPT | Mod: RT

## 2020-02-20 NOTE — PROGRESS NOTES
CHIEF COMPLAINT: ORIF Right Distal Fibula Postop    PROBLEMS:   Patient has no noted problems.    PATIENT REPORTED MEDICATIONS:  DAILY MULTIVITAMIN ORAL CAPSULE (MULTIPLE VITAMINS-MINERALS) ; Route: ORAL    PATIENT REPORTED ALLERGIES:  AUGMENTIN (AMOXICILLIN-POT CLAVULANATE) (SevereReaction: No reaction details noted)      HISTORY OF PRESENT ILLNESS:    The patient is six days out from ORIF of the fibula on the right side.  Doing well.  No acute concerns.    PAST ORTHOPEDIC SURGICAL HISTORY:    ORIF of Right Distal Fibula 02/07/2020, Jatinder Foster DPM    PAST SURGICAL HISTORY:    Hernia Surgery    SOCIAL HISTORY:   Marital Status:    Occupation: [employer] Nurse, ER  Alcohol Use:  Yes  Tobacco Use:  Former  History HIV:  No  History Hepatitis:  No     PHYSICAL EXAMINATION:    Surgical site is well-healed.  Staples removed.  CMS is intact.  No signs of infection.    X-RAY:  Three views of the right ankle were taken.  They demonstrate intact and well-positioned hardware.  Anatomic reduction of the fibula.  No concerns otherwise.    ASSESSMENT:    Status post open reduction internal fixation of the right fibula.    PLAN:   Discussed progression of treatment.  She can weight bear as tolerated in her boot.  She was given a Reparel sleeve.  I will see her back in four to five weeks with repeat x-rays and progress from there.  She is unable to return to work in a boot, so will hopefully progress her into a brace the next time and get her back work.    The patient received a boot undersleeve () today.  The patient signed the [employer] Waiver Form.    X-RAYS NEXT VISIT:  Repeat x-rays, right ankle.    Dictated by Jatinder Foster DPM  cc:  DO Dr. Cristian Jaquez at [facility]    D:  02/13/2020  T:  02/19/2020    Typed and/or reviewed and corrected by signing  below, and sent to the Physician for final review and signature.    This report was created using voice  recording software and computer-generated templates. Although every effort has been made to review for and eliminate errors, some errors may still occur.

## 2020-03-11 ENCOUNTER — HEALTH MAINTENANCE LETTER (OUTPATIENT)
Age: 45
End: 2020-03-11

## 2020-03-12 ENCOUNTER — HOSPITAL ENCOUNTER (OUTPATIENT)
Dept: GENERAL RADIOLOGY | Facility: OTHER | Age: 45
End: 2020-03-12
Attending: PODIATRIST
Payer: COMMERCIAL

## 2020-03-12 ENCOUNTER — OFFICE VISIT (OUTPATIENT)
Dept: ORTHOPEDICS | Facility: OTHER | Age: 45
End: 2020-03-12
Attending: PODIATRIST
Payer: COMMERCIAL

## 2020-03-12 DIAGNOSIS — Z98.890 S/P ORIF (OPEN REDUCTION INTERNAL FIXATION) FRACTURE: Primary | ICD-10-CM

## 2020-03-12 DIAGNOSIS — Z87.81 S/P ORIF (OPEN REDUCTION INTERNAL FIXATION) FRACTURE: Primary | ICD-10-CM

## 2020-03-12 DIAGNOSIS — Z87.81 S/P ORIF (OPEN REDUCTION INTERNAL FIXATION) FRACTURE: ICD-10-CM

## 2020-03-12 DIAGNOSIS — Z98.890 S/P ORIF (OPEN REDUCTION INTERNAL FIXATION) FRACTURE: ICD-10-CM

## 2020-03-12 PROCEDURE — 99024 POSTOP FOLLOW-UP VISIT: CPT | Performed by: PODIATRIST

## 2020-03-12 PROCEDURE — 73610 X-RAY EXAM OF ANKLE: CPT | Mod: RT

## 2020-03-12 NOTE — PROGRESS NOTES
Visit Date:   2020      The patient is here 5 weeks out from ORIF of fibula, doing well.  No acute concern.  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      PHYSICAL EXAMINATION:     EXTREMITIES:  Surgery site well healed, scar looks good.  No signs of infection.  Minimal swelling.  CNS is intact.        IMAGING STUDIES:  Three views of the ankle demonstrated intact well placed hardware, good alignment.  Ankle mortise is intact.  No concerns otherwise radiographically.      ASSESSMENT:  Five weeks out from open reduction, internal fixation right fibula.      PLAN:  Discussed recommendations of treatment.  I think release to 8-hour shifts only starting next week is appropriate.  She will use a brace and a good supportive tennis shoe.  We will keep that restriction on for 2 weeks.  If she needs longer, certainly I would be okay with that, but she will see how she progresses over the next couple of weeks and then hopefully without restriction thereafter.  I will see her back 1 more time in 4 weeks with repeat x-rays.         MUSA LAIRD DPM             D: 2020   T: 2020   MT: LEA      Name:     APPLE ALMAGURE   MRN:      [id number]        Account:      [id number]   :      1975           Visit Date:   2020      Document: [id number]

## 2020-03-12 NOTE — PROGRESS NOTES
Patient is here for follow up on her right ankle.   Dena Bañuelos LPN .....................3/12/2020 10:02 AM

## 2020-03-16 ENCOUNTER — HOSPITAL ENCOUNTER (OUTPATIENT)
Dept: PHYSICAL THERAPY | Facility: OTHER | Age: 45
Setting detail: THERAPIES SERIES
End: 2020-03-16
Attending: PODIATRIST
Payer: COMMERCIAL

## 2020-03-16 DIAGNOSIS — Z98.890 S/P ORIF (OPEN REDUCTION INTERNAL FIXATION) FRACTURE: ICD-10-CM

## 2020-03-16 DIAGNOSIS — Z87.81 S/P ORIF (OPEN REDUCTION INTERNAL FIXATION) FRACTURE: ICD-10-CM

## 2020-03-16 PROCEDURE — 97110 THERAPEUTIC EXERCISES: CPT | Mod: GP

## 2020-03-16 PROCEDURE — 97161 PT EVAL LOW COMPLEX 20 MIN: CPT | Mod: GP

## 2020-03-16 NOTE — PROGRESS NOTES
03/16/20 0900   General Information   Type of Visit Initial OP Ortho PT Evaluation   Start of Care Date 03/16/20   Referring Physician Dr. Foster   Patient/Family Goals Statement return to prior level of function    Orders Evaluate and Treat   Orders Comment  Eval and treat - teach home therapy - s/p Rt ankle ORIF     Date of Order 03/12/20   Certification Required? No   Medical Diagnosis S/P ORIF (open reduction internal fixation) fracture Z98.890, Z87.81    Surgical/Medical history reviewed Yes   Body Part(s)   Body Part(s) Ankle/Foot   Presentation and Etiology   Pertinent history of current problem (include personal factors and/or comorbidities that impact the POC) Patient slipped on ice on 2/2/2020.  She sustained a right ankle fibular fracture which resulted in an ORIF of the right fibula.  She has been off crutches for a couple weeks.  She is now in a lace up ankle brace.  She reports continued progress with ROM, and gait.    Impairments D. Decreased ROM;E. Decreased flexibility;F. Decreased strength and endurance;H. Impaired gait   Functional Limitations perform activities of daily living;perform required work activities;perform desired leisure / sports activities   Symptom Location Lateral ankle    How/Where did it occur With a fall   Onset date of current episode/exacerbation 02/02/20   Chronicity New   Pain rating (0-10 point scale) Best (/10);Worst (/10)   Best (/10) 1   Worst (/10) 4   Pain quality B. Dull   Frequency of pain/symptoms B. Intermittent   Pain/symptoms exacerbated by B. Walking;G. Certain positions   Pain/symptoms eased by F. Certain positions;C. Rest   Progression of symptoms since onset: Improved   Prior Level of Function   Prior Level of Function-Mobility independent    Prior Level of Function-ADLs independent   Current Level of Function   Current Community Support Family/friend caregiver   Patient role/employment history A. Employed   Fall Risk Screen   Fall screen completed by PT    Have you fallen 2 or more times in the past year? No   Have you fallen and had an injury in the past year? Yes   Is patient a fall risk? No   Fall screen comments normally no issues with balance   Ankle/Foot Objective Findings   Observation Figure 8 measure right ankle: 50 cm, Resolving ankle    Gait/Locomotion Mild decrease in stance phase on right,    Ankle/Foot ROM Comment Right ankle:  DF=4  , PF=55,  IV=35  EV=15   Ankle/Foot Strength Comments Right: DF=4/5, PF=4/5, IV=4/5 , EV=4/5   Palpation mild pain over the distal fibula and lateral ankle ligaments   Planned Therapy Interventions   Planned Therapy Interventions gait training;joint mobilization;manual therapy;ROM;strengthening;stretching;neuromuscular re-education   Planned Modality Interventions   Planned Modality Interventions Cryotherapy   Clinical Impression   Criteria for Skilled Therapeutic Interventions Met yes, treatment indicated   PT Diagnosis Right ankle ORIF resulting in decreased ROM, weakness, impaired gait, impaired neuromuscular control,    Influenced by the following impairments impaired ROM, impaired gait, weakness, Impaired neuromuscualr control    Functional limitations due to impairments walking over uneven terrain, prolonged walking, prolonged standing, fitness,    Clinical Presentation Stable/Uncomplicated   Clinical Presentation Rationale patient is making good progress    Clinical Decision Making (Complexity) Low complexity   Therapy Frequency   (4 visits)   Predicted Duration of Therapy Intervention (days/wks) 4 weeks   Risk & Benefits of therapy have been explained Yes   Patient, Family & other staff in agreement with plan of care Yes   Education Assessment   Preferred Learning Style Listening;Demonstration;Pictures/video   Barriers to Learning No barriers   ORTHO GOALS   PT Ortho Eval Goals 1;2;3   Ortho Goal 1   Goal Identifier ROM   Goal Description Demonstrate 10 degrees or greater right ankle dorsiflexion to allow return to  a normal gait pattern   Target Date 05/11/20   Ortho Goal 2   Goal Identifier Proprioception   Goal Description Demonstrate ability to stand on right foot for 60 seconds on a compliant surface to alllow return to walking on uneven surfaces with without instability.    Target Date 05/11/20   Ortho Goal 3   Goal Identifier Walking   Goal Description Return to walking a full 12 hour nursing shift with pain at 2/10 or less right ankle    Target Date 05/11/20   Total Evaluation Time   PT Eval, Low Complexity Minutes (42933) 15

## 2020-03-26 ENCOUNTER — HOSPITAL ENCOUNTER (OUTPATIENT)
Dept: PHYSICAL THERAPY | Facility: OTHER | Age: 45
Setting detail: THERAPIES SERIES
End: 2020-03-26
Attending: PODIATRIST
Payer: COMMERCIAL

## 2020-03-26 PROCEDURE — 97110 THERAPEUTIC EXERCISES: CPT | Mod: GP

## 2020-04-08 DIAGNOSIS — Z98.890 S/P ORIF (OPEN REDUCTION INTERNAL FIXATION) FRACTURE: Primary | ICD-10-CM

## 2020-04-08 DIAGNOSIS — Z87.81 S/P ORIF (OPEN REDUCTION INTERNAL FIXATION) FRACTURE: Primary | ICD-10-CM

## 2020-04-16 ENCOUNTER — HOSPITAL ENCOUNTER (OUTPATIENT)
Dept: GENERAL RADIOLOGY | Facility: OTHER | Age: 45
End: 2020-04-16
Attending: PODIATRIST
Payer: COMMERCIAL

## 2020-04-16 ENCOUNTER — OFFICE VISIT (OUTPATIENT)
Dept: ORTHOPEDICS | Facility: OTHER | Age: 45
End: 2020-04-16
Attending: PODIATRIST
Payer: COMMERCIAL

## 2020-04-16 DIAGNOSIS — Z87.81 S/P ORIF (OPEN REDUCTION INTERNAL FIXATION) FRACTURE: ICD-10-CM

## 2020-04-16 DIAGNOSIS — Z00.00 ROUTINE GENERAL MEDICAL EXAMINATION AT A HEALTH CARE FACILITY: Primary | ICD-10-CM

## 2020-04-16 DIAGNOSIS — Z98.890 S/P ORIF (OPEN REDUCTION INTERNAL FIXATION) FRACTURE: ICD-10-CM

## 2020-04-16 PROCEDURE — 99024 POSTOP FOLLOW-UP VISIT: CPT | Performed by: PODIATRIST

## 2020-04-16 PROCEDURE — 73610 X-RAY EXAM OF ANKLE: CPT | Mod: RT

## 2020-04-16 NOTE — PROGRESS NOTES
Patient is here for follow up on her right ankle.   Dena Bañuelos LPN .....................4/16/2020 9:54 AM

## 2020-04-16 NOTE — PROGRESS NOTES
Visit Date:   2020      Yesy is here 10 weeks out from ORIF of right fibula, doing very well.  No acute concerns.  Returned to some working out type activity and normal shoegear.  Again, no acute concerns today.      PHYSICAL EXAMINATION:  Surgical site well-healed.  Minimal swelling.  Ambulatory in normal shoegear with a normal gait and with no assistive device.      IMAGING:  Three views of the right ankle were taken demonstrating intact well positioned hardware, good alignment.  Ankle mortise is intact.  No acute concerns identified.      ASSESSMENT:  10 weeks out from ORIF right ankle.      PLAN:  I discussed progression and treatment.  The patient is doing quite well.  I did release her without restriction.  Continue to progress shoegear and activity as tolerated and follow up with me with any concerns.  Otherwise, again, released without restriction today.         MUSA LAIRD DPM      CC: [facility]           D: 2020   T: 2020   MT: TAMMY      Name:     YESY ALMAGUER   MRN:      [id number]        Account:      [id number]   :      1975           Visit Date:   2020      Document: [id number]

## 2020-06-02 NOTE — PROGRESS NOTES
Outpatient Physical Therapy Discharge Note     Patient: Yesy Garg  : 1975    Beginning/End Dates of Reporting Period:  3/16/2020 to 3/26/2020     Referring Provider: Dr. Foster    Therapy Diagnosis: S/P ORIF (open reduction internal fixation) fracture Z98.890, Z87.81      Client Self Report on 3/26/2020: Patient reports continued improvement with ROM, strength and gait.  No difficulty with the current HEP.  She is pleased with her current progress. She feels that she is ready to progress the HEP and begin a trial of independent management.    No additional visits were required after 3/26/2020    Objective Measurements: Not completed due to no need for additional follow up after 3/26/2020      Goals:  Goal Identifier ROM   Goal Description Demonstrate 10 degrees or greater right ankle dorsiflexion to allow return to a normal gait pattern   Target Date 20   Date Met      Progress: progressing at time of final visit      Goal Identifier Proprioception   Goal Description Demonstrate ability to stand on right foot for 60 seconds on a compliant surface to alllow return to walking on uneven surfaces with without instability.    Target Date 20   Date Met      Progress: progressing at time of final visit      Goal Identifier Walking   Goal Description Return to walking a full 12 hour nursing shift with pain at 2/10 or less right ankle    Target Date 20   Date Met      Progress: progressing at time of final visit        Plan:  Discharge from therapy.    Discharge:    Reason for Discharge: Patient chooses to discontinue therapy.    Discharge Plan: Patient to continue home program.

## 2020-06-17 ENCOUNTER — HOSPITAL ENCOUNTER (OUTPATIENT)
Dept: MAMMOGRAPHY | Facility: OTHER | Age: 45
End: 2020-06-17
Attending: FAMILY MEDICINE
Payer: COMMERCIAL

## 2020-06-17 DIAGNOSIS — Z12.31 VISIT FOR SCREENING MAMMOGRAM: ICD-10-CM

## 2020-06-17 DIAGNOSIS — Z12.31 SCREENING MAMMOGRAM FOR HIGH-RISK PATIENT: ICD-10-CM

## 2020-06-17 PROCEDURE — 77067 SCR MAMMO BI INCL CAD: CPT

## 2020-10-12 ENCOUNTER — RESULTS ONLY (OUTPATIENT)
Dept: LAB | Age: 45
End: 2020-10-12

## 2020-10-13 LAB
SARS-COV-2 RNA SPEC QL NAA+PROBE: ABNORMAL
SPECIMEN SOURCE: ABNORMAL

## 2020-10-14 ENCOUNTER — TELEPHONE (OUTPATIENT)
Dept: EMERGENCY MEDICINE | Facility: CLINIC | Age: 45
End: 2020-10-14

## 2020-10-14 NOTE — TELEPHONE ENCOUNTER
"Coronavirus (COVID-19) Notification    Caller Name (Patient, parent, daughter/son, grandparent, etc)  Patient     Reason for call  Notify of Positive Coronavirus (COVID-19) lab results, assess symptoms,  review [facility] recommendations    Lab Result    Lab test:  2019-nCoV rRt-PCR or SARS-CoV-2 PCR    Oropharyngeal AND/OR nasopharyngeal swabs is POSITIVE for 2019-nCoV RNA/SARS-COV-2 PCR (COVID-19 virus)    RN Recommendations/Instructions per [facility] Coronavirus COVID-19 recommendations    Brief introduction script  Introduce self then review script:  \"I am calling on behalf of [email or web address].  We were notified that your Coronavirus test (COVID-19) for was POSITIVE for the virus.  I have some information to relay to you but first I wanted to mention that the [place] Dept of Health will be contacting you shortly [it's possible MD already called Patient] to talk to you more about how you are feeling and other people you have had contact with who might now also have the virus.  Also, [facility] is Partnering with the [facility] for Covid-19 research, you may be contacted directly by research staff.\"    Assessment (Inquire about Patient's current symptoms)   Assessment   Current Symptoms at time of phone call: (if no symptoms, document No symptoms]  cough, congestion    Symptoms onset (if applicable)  10/9/20     If at time of call, Patients symptoms hare worsened, the Patient should contact 911 or have someone drive them to Emergency Dept promptly:      If Patient calling 911, inform 911 personal that you have tested positive for the Coronavirus (COVID-19).  Place mask on and await 911 to arrive.    If Emergency Dept, If possible, please have another adult drive you to the Emergency Dept but you need to wear mask when in contact with other people.      Review information with Patient    Your result was positive. This means you have COVID-19 (coronavirus).  We have sent you a letter that " reviews the information that I'll be reviewing with you now.    How can I protect others?    If you have symptoms: stay home and away from others (self-isolate) until:    You've had no fever--and no medicine that reduces fever--for 1 full day (24 hours). And       Your other symptoms have gotten better. For example, your cough or breathing has improved. And     At least 10 days have passed since your symptoms started. (If you've been told by a doctor that you have a weak immune system, wait 20 days.)     If you don't have symptoms: Stay home and away from others (self-isolate) until at least 10 days have passed since your first positive COVID-19 test. (Date test collected)    During this time:    Stay in your own room, including for meals. Use your own bathroom if you can.    Stay away from others in your home. No hugging, kissing or shaking hands. No visitors.     Don't go to work, school or anywhere else.     Clean  high touch  surfaces often (doorknobs, counters, handles, etc.). Use a household cleaning spray or wipes. You'll find a full list on the EPA website at www.epa.gov/pesticide-registration/list-n-disinfectants-use-against-sars-cov-2.     Cover your mouth and nose with a mask, tissue or other face covering to avoid spreading germs.    Wash your hands and face often with soap and water.    Caregivers in these groups are at risk for severe illness due to COVID-19:  o People 65 years and older  o People who live in a nursing home or long-term care facility  o People with chronic disease (lung, heart, cancer, diabetes, kidney, liver, immunologic)  o People who have a weakened immune system, including those who:  - Are in cancer treatment  - Take medicine that weakens the immune system, such as corticosteroids  - Had a bone marrow or organ transplant  - Have an immune deficiency  - Have poorly controlled HIV or AIDS  - Are obese (body mass index of 40 or higher)  - Smoke regularly    Caregivers should wear  gloves while washing dishes, handling laundry and cleaning bedrooms and bathrooms.    Wash and dry laundry with special caution. Don't shake dirty laundry, and use the warmest water setting you can.    If you have a weakened immune system, ask your doctor about other actions you should take.    For more tips, go to www.cdc.gov/coronavirus/2019-ncov/downloads/10Things.pdf.    You should not go back to work until you meet the guidelines above for ending your home isolation. You don't need to be retested for COVID-19 before going back to work--studies show that you won't spread the virus if it's been at least 10 days since your symptoms started (or 20 days, if you have a weak immune system).    Employers: This document serves as formal notice of your employee's medical guidelines for going back to work. They must meet the above guidelines before going back to work in person.    How can I take care of myself?    1. Get lots of rest. Drink extra fluids (unless a doctor has told you not to).    2. Take Tylenol (acetaminophen) for fever or pain. If you have liver or kidney problems, ask your family doctor if it's okay to take Tylenol.     Take either:     650 mg (two 325 mg pills) every 4 to 6 hours, or     1,000 mg (two 500 mg pills) every 8 hours as needed.     Note: Don't take more than 3,000 mg in one day. Acetaminophen is found in many medicines (both prescribed and over-the-counter medicines). Read all labels to be sure you don't take too much.    For children, check the Tylenol bottle for the right dose (based on their age or weight).    3. If you have other health problems (like cancer, heart failure, an organ transplant or severe kidney disease): Call your specialty clinic if you don't feel better in the next 2 days.    4. Know when to call 911: Emergency warning signs include:    Trouble breathing or shortness of breath    Pain or pressure in the chest that doesn't go away    Feeling confused like you haven't  felt before, or not being able to wake up    Bluish-colored lips or face    5. Sign up for [employer]. We know it's scary to hear that you have COVID-19. We want to track your symptoms to make sure you're okay over the next 2 weeks. Please look for an email from [employer]--this is a free, online program that we'll use to keep in touch. To sign up, follow the link in the email. Learn more at [email or web address].    Where can I get more information?    [facility]: [email or web address]    Coronavirus Basics: www.health.[facility].[place]./diseases/coronavirus/basics.html    What to Do If You're Sick: www.cdc.gov/coronavirus/2019-ncov/about/steps-when-sick.html    Ending Home Isolation: www.cdc.gov/coronavirus/2019-ncov/hcp/disposition-in-home-patients.html     Caring for Someone with COVID-19: www.cdc.gov/coronavirus/2019-ncov/if-you-are-sick/care-for-someone.html     [facility] clinical trials (COVID-19 research studies): [email or web address].[facility]-clinical-trials     A Positive COVID-19 letter will be sent via [employer] or the mail. (Exception, no letters sent to Presurgerical/Preprocedure Patients)    [Name]  Eric Ramírez RN  [facility]  COVID19 Results Team RN  Ph# [phone]

## 2020-12-23 ENCOUNTER — HOSPITAL ENCOUNTER (OUTPATIENT)
Dept: MRI IMAGING | Facility: OTHER | Age: 45
Discharge: HOME | End: 2020-12-23
Attending: SURGERY | Admitting: SURGERY
Payer: COMMERCIAL

## 2020-12-23 DIAGNOSIS — Z91.89 AT HIGH RISK FOR BREAST CANCER: ICD-10-CM

## 2020-12-23 PROCEDURE — 255N000002 HC RX 255 OP 636: Performed by: SURGERY

## 2020-12-23 PROCEDURE — A9575 INJ GADOTERATE MEGLUMI 0.1ML: HCPCS | Performed by: SURGERY

## 2020-12-23 PROCEDURE — 77049 MRI BREAST C-+ W/CAD BI: CPT

## 2020-12-23 RX ADMIN — GADOTERATE MEGLUMINE 15 ML: 376.9 INJECTION INTRAVENOUS at 15:39

## 2021-01-03 ENCOUNTER — HEALTH MAINTENANCE LETTER (OUTPATIENT)
Age: 46
End: 2021-01-03

## 2021-04-18 ENCOUNTER — RESULTS ONLY (OUTPATIENT)
Dept: LAB | Age: 46
End: 2021-04-18

## 2021-04-18 LAB
LABORATORY COMMENT REPORT: NORMAL
SARS-COV-2 RNA RESP QL NAA+PROBE: NEGATIVE
SPECIMEN SOURCE: NORMAL

## 2021-06-21 ENCOUNTER — RESULTS ONLY (OUTPATIENT)
Dept: LAB | Age: 46
End: 2021-06-21

## 2021-06-21 LAB
SARS-COV-2 RNA RESP QL NAA+PROBE: NORMAL
SPECIMEN SOURCE: NORMAL

## 2021-07-01 ENCOUNTER — HOSPITAL ENCOUNTER (OUTPATIENT)
Dept: MAMMOGRAPHY | Facility: OTHER | Age: 46
Discharge: HOME | End: 2021-07-01
Attending: FAMILY MEDICINE | Admitting: FAMILY MEDICINE
Payer: COMMERCIAL

## 2021-07-01 DIAGNOSIS — Z12.31 VISIT FOR SCREENING MAMMOGRAM: ICD-10-CM

## 2021-07-01 PROCEDURE — 77063 BREAST TOMOSYNTHESIS BI: CPT

## 2021-07-14 ENCOUNTER — OFFICE VISIT (OUTPATIENT)
Dept: FAMILY MEDICINE | Facility: OTHER | Age: 46
End: 2021-07-14
Attending: FAMILY MEDICINE
Payer: COMMERCIAL

## 2021-07-14 VITALS
TEMPERATURE: 98.5 F | RESPIRATION RATE: 16 BRPM | WEIGHT: 155 LBS | HEART RATE: 80 BPM | SYSTOLIC BLOOD PRESSURE: 110 MMHG | BODY MASS INDEX: 28.52 KG/M2 | HEIGHT: 62 IN | OXYGEN SATURATION: 98 % | DIASTOLIC BLOOD PRESSURE: 74 MMHG

## 2021-07-14 DIAGNOSIS — Z13.220 LIPID SCREENING: ICD-10-CM

## 2021-07-14 DIAGNOSIS — Z00.00 ROUTINE HISTORY AND PHYSICAL EXAMINATION OF ADULT: Primary | ICD-10-CM

## 2021-07-14 DIAGNOSIS — Z13.1 DIABETES MELLITUS SCREENING: ICD-10-CM

## 2021-07-14 DIAGNOSIS — Z12.4 CERVICAL CANCER SCREENING: ICD-10-CM

## 2021-07-14 DIAGNOSIS — D23.72 DERMATOFIBROMA OF LEFT LOWER EXTREMITY: ICD-10-CM

## 2021-07-14 PROCEDURE — 99396 PREV VISIT EST AGE 40-64: CPT | Performed by: FAMILY MEDICINE

## 2021-07-14 RX ORDER — TRIAMCINOLONE ACETONIDE 5 MG/G
CREAM TOPICAL 2 TIMES DAILY
Qty: 15 G | Refills: 1 | Status: SHIPPED | OUTPATIENT
Start: 2021-07-14 | End: 2022-01-18

## 2021-07-14 ASSESSMENT — MIFFLIN-ST. JEOR: SCORE: 1306.33

## 2021-07-14 ASSESSMENT — PAIN SCALES - GENERAL: PAINLEVEL: NO PAIN (0)

## 2021-07-14 NOTE — PATIENT INSTRUCTIONS
Patient Education     Prevention Guidelines, Women Ages 40 to 49  Screening tests and vaccines are an important part of managing your health. A screening test is done to find diseases in people who don't have any symptoms. The goal is to find a disease early so lifestyle changes and checkups can reduce the risk of disease. Or the goal may be to detect it early to treat it most effectively. Screening tests are not used to diagnose a disease. But they are used to see if more testing is needed. Health counseling is important, too. Below are guidelines for these, for women ages 40 to 49. Talk with your healthcare provider to make sure you re up-to-date on what you need.  Screening Who needs it How often   Type 2 diabetes or prediabetes All women beginning at age 45 and women without symptoms at any age who are overweight or obese and have 1 or more additional risk factors for diabetes At least every 3 years1   Type 2 diabetes or prediabetes All women diagnosed with gestational diabetes Lifelong testing every 3 years   Type 2 diabetes All women with prediabetes Every year   Alcohol misuse All women in this age group At routine exams   Blood pressure All women in this age group Yearly checkup if your blood pressure is normal  Normal blood pressure is less than 120/80 mm Hg  If your blood pressure reading is higher than normal, follow the advice of your healthcare provider   Breast cancer All women at average risk in this age group Screening with a mammogram can start at age 40.2 Talk with your healthcare provider to help you decide when to start screening. At age 45 start yearly mammograms.3   Cervical cancer All women in this age group, except women who have had a complete hysterectomy Pap test every 3 years or Pap test plus human papilloma virus (HPV) test every 5 years   Colorectal cancer Women age 45 years and older at average risk Multiple tests are available and are used at different times. Possible tests  include:    Flexible sigmoidoscopy every 5 years, or    Colonoscopy every 10 years, or    CT colonography (virtual colonoscopy) every 5 years, or    Yearly fecal occult blood test, or    Yearly fecal immunochemical test every year, or    Stool DNA test, every 3 years  If you choose a test other than a colonoscopy and have an abnormal test result, you will need to follow-up with a colonoscopy. Screening advice varies among expert groups. Talk with your healthcare provider about which tests are best for you.  Some people should be screened using a different schedule because of their personal or family health history. Talk with your healthcare provider about your health history.   Chlamydia Women at increased risk for infection At routine exams if you're at risk or have symptoms   Depression All women in this age group At routine exams   Gonorrhea Sexually active women at increased risk for infection At routine exams   Hepatitis C Anyone at increased risk; 1 time for those born between 1945 and 1965 At routine exams   High cholesterol or triglycerides All women ages 45 and older who are at risk for coronary artery disease; younger women, talk with your healthcare provider At least every 5 years   HIV All women At routine exams. Those with risk factors for HIV should be tested at least annually.   Obesity All women in this age group At routine exams   Syphilis Women at increased risk for infection-talk with your healthcare provider At routine exams   Tuberculosis Women at increased risk for infection-talk with your healthcare provider Ask your healthcare provider   Vision All women in this age group Complete exam at age 40 and eye exams every 2 to 4 years. If you have a chronic disease, ask your healthcare provider how often you should have your eyes examined.4   Vaccine Who needs it How often   Chickenpox (varicella) All women in this age group who have no record of this infection or vaccine 2 doses; the second dose  should be given at least 4 weeks after the first dose   Hepatitis A Women at increased risk for infection-talk with your healthcare provider 2 doses given 6 months apart   Hepatitis B Women at increased risk for infection-talk with your healthcare provider 3 doses over 6 months; second dose should be given 1 month after the first dose; the third dose should be given at least 2 months after the second dose and at least 4 months after the first dose   Haemophilus influenzae Type B (HIB) Women at increased risk 1 to 3 doses   Influenza (flu) All women in this age group Once a year   Measles, mumps, rubella (MMR) All women in this age group who have no record of these infections or vaccines 1 or 2 doses   Meningococcal Women at increased risk for infection-talk with your healthcare provider 1 or more doses   Pneumococcal conjugate vaccine (PCV13) and pneumococcal polysaccharide vaccine (PPSV23) Women at increased risk for infection-talk with your healthcare provider 1 or 2 doses   Tetanus/diphtheria/pertussis (Td/Tdap) booster All women in this age group A 1-time dose of Tdap instead of a Td booster after age 18, then Td every 10 years   Counseling Who needs it How often   BRCA gene mutation testing for breast and ovarian cancer susceptibility Women with increased risk for having gene mutation When your risk is known   Breast cancer and chemoprevention Women at high risk for breast cancer When your risk is known   Diet and exercise Women who are overweight or obese When diagnosed, and then at routine exams   Domestic violence Women at the age in which they are able to have children At routine exams   Sexually transmitted infection prevention Women at increased risk for infection-talk with your healthcare provider At routine exams   Use of tobacco and the health effects it can cause All women in this age group Every exam   1 American Diabetes Association  2 American College of Obstetricians and Gynecologists   3 American  "Cancer Society  4 American Academy of Ophthalmology  [employer] last reviewed this educational content on 11/1/2017 2000-2021 [employer]. All rights reserved. This information is not intended as a substitute for professional medical care. Always follow your healthcare professional's instructions.         Patient Education     Honoring Choices - Your Rights: Making Your Own Health Care Treatment Decisions  [place] Law:  [place] law allows you to inform others of your health care wishes. You have the right to state your wishes or appoint an agent in writing so that others will know what you want if you can't tell them because of illness or injury. The information that follows tells about health care directives and how to prepare them. It does not give every detail of the law.  What is a health care directive?  A health care directive is a written document that informs others of your wishes about health care. It allows you to name a person (\"agent\") to decide for you if you are unable to decide. It also allows you to name an agent if you want someone else to decide for you while you still have capacity. You must be at least 18 years old to make a health care directive.  Why have a health care directive?  A health care directive is important if your attending physician determines you can't communicate your health care choices (because of physical or mental incapacity). It is also important if you wish to have someone else make your health care decisions. In some circumstances, your directive may state that you want someone other than an attending physician to decide when you cannot make your own decisions.  Must I have a health care directive? What happens if I don't have one?  You don't have to have a health care directive. But, writing one helps to make sure your wishes are followed. You will still receive medical treatment if you don't have a written directive. Health care providers will listen to " what people close to you say about your treatment preferences, but the best way to be sure your wishes are followed is to have a health care directive.  How do I make a health care directive?  There are forms for health care directives. You don't have to use a form, but your health care directive must meet the following requirements to be legal:    Be in writing, dated, and state your name.    Be signed by you or someone you authorize to sign for you when you can understand and communicate your health care wishes.    Have your signature verified by a  or two witnesses (notaries and witnesses cannot also be named as agent).    Include the appointment of an agent to make health care decisions for you and/or instructions about the health care choices you wish to make.  Before you prepare or revise your directive, you should discuss your health care wishes with your doctor or other health care provider. Information about where to get health care directive forms is given at the end of this document.  What can I put in a health care directive?  You have many choices of what to put in your health care directive. For example, you may include:    The person you trust as your agent to make health care decisions for you. You can name alternate agents, in case the first agent is unavailable, or joint agents.    Your goals, values, preferences, and cultural beliefs about health care.    The types of medical treatment you would want (or not want).    How you want your agent or agents to decide.    Where you want to receive care.    Instructions about artificial nutrition and hydration.    Mental health treatments that use electroshock therapy or neuroleptic medications.    Instructions if you are pregnant.    Donation of organs, tissues and eyes.     arrangements.    Who you would like as your guardian or conservator if there is a court action.  You may be as specific or as general as you wish. You can choose  which issues or treatments to deal with in your health care directive.  Are there any limits to what I can put in my health care directive?  There are some limits about what you can put in your health care directive. For instance:    Your agent must be at least 18 years of age.    Your agent cannot be your health care provider, unless the health care provider is a family member or you give reasons for the naming of the agent in your directive.    You cannot request health care treatment that is outside of reasonable medical practice.    You cannot request assisted suicide.  How long does a health care directive last? Can I change it?  Your health care directive lasts until you change or cancel it. As long as the changes meet the health care directive requirements listed above, you may cancel your directive by any of the following:    A written statement saying you want to cancel it    Destroying it    Telling at least two other people you want to cancel it    Writing a new health care directive.  What should I do with my health care directive after I have signed it?  You should inform others of your health care directive and give people copies of it. You may wish to inform family members, your health care agent or agents, and your health care providers that you have a health care directive. You should give them a copy. It's a good idea to review and update your directive as your needs change. Keep it in a safe place where it is easily found.   We are committed to making your health care wishes known. You may give a copy of your directive to any care team member or bring or mail a copy to any of our locations, and we will keep it in your medical record.  What if I've already prepared a health care document? Is it still good?  Before August 1, 1998, [place] law provided for several other types of directives, including living bills, durable health care [place] of  and mental health declarations. The law  changed so people can use one form for all their health care instructions. Forms created before August 1, 1998 are still legal if they followed the law in effect when written. They are also legal if they meet the requirements of the new law (described above). You may want to review any existing documents to make sure they say what you want and meet all requirements.  I prepared my directive in another state. Is it still good?  Health care directives prepared in other states are legal if they meet the requirements of the other state's laws or the [place] requirements. But requests for assisted suicide will not be followed.  What if my health care provider refuses to follow my health care directive?  Your health care provider generally will follow your health care directive, or any instructions from your agent, as long as the health care follows reasonable medical practice. But, you or your agent cannot request treatment that will not help you or which the provider cannot provide. If the provider cannot follow the agent's directions about life-sustaining treatment, the provider must inform the agent. The provider must also document the notice in your medical record. The provider must allow the agency to arrange to transfer you to another provider who will follow the agent's directions.  What if I believe a health care provider has not followed health care directive requirements?  Complaints of this type can be filed with the Office of Health Facility Complaints at [phone] ([facility]) or toll free at [phone].  What if I believe a health plan has not followed health care directive requirements?  Complaints of this type can be filed with the [place] Health Information Clearinghouse at [phone] or toll free at [phone].  How to obtain more information  Ask any care team member for information, forms, or how to register for a free class on advance care planning and creating a health care  directive. Or you may:   visit [email or web address],   email [email or web address] or   call [phone].  Find other health care directive forms at [place] Board on Aging's Senior LinkAge Line: www.asap54.com.netor call [phone].   For informational purposes only. Not to replace the advice of your health care provider.  Copyright   2012 [employer]. All rights reserved. [employer] 1626 - REV 06/18.

## 2021-07-14 NOTE — PROGRESS NOTES
ANNUAL PHYSICAL - FEMALE    HPI: Yesy presents for a yearly exam.  Concerns include:  1. Mole check.  Has multiple.  No specific changes.  2. Started smoking 1 year ago with stress.  Started by a few while she was drinking beers/with friends.  Now finding she is having ~1/4-1/2 ppd.  Not always.  Making her feel sluggish; but wanting to make sure it is not anything else more concerning.    Patient's last menstrual period was 06/14/2021 (approximate).   Contraception:  with vasectomy  Risk for STI?: No  Last pap: 5/2018; normal.  Due: 5/2023.  Any hx of abnormal paps:  2015 had ASCUS with neg HPV.  Recheck in 2017 and 2018 were both normal.  FH ofearly CA?: Breast - as young as early 30s.    Cholesterol/DM concerns/screening: Due  Tobacco?: No  Calcium intake: No  DEXA: NA  Last mammo: MR 12/23/2020, Birads-2.  Mammo 7/1/2021, Birads-2.  Gets alternating due to increased risk of breast CA (family history).  Colonoscopy: @ 47  Immunizations: Tdap 9/3/2013; receives flu yearly; Shingrix @ 50; PNA @ 65/66.  Consider Covid-19 vaccine.    Patient Active Problem List   Diagnosis     At high risk for breast cancer     Palpitations     Closed fracture of distal end of right fibula       Past Medical History:   Diagnosis Date     Atypical mole     removed from face and L collarbone     Palpitations 2018    SVT on monitoring.  stress, caffiene - improved.         Past Surgical History:   Procedure Laterality Date     BIOPSY OF SKIN LESION      atypical on face and L collarbone     BREAST BIOPSY, RT/LT Left      HERNIA REPAIR, UMBILICAL N/A ~2000       Social History     Socioeconomic History     Marital status:      Spouse name: Not on file     Number of children: Not on file     Years of education: Not on file     Highest education level: Not on file   Occupational History     Not on file   Tobacco Use     Smoking status: Former Smoker     Smokeless tobacco: Never Used   Substance and Sexual Activity      "Alcohol use: Yes     Comment: a couple drinks a week     Drug use: Never     Sexual activity: Yes     Partners: Male   Other Topics Concern     Not on file   Social History Narrative     Not on file     Social Determinants of Health     Financial Resource Strain:      Difficulty of Paying Living Expenses:    Food Insecurity:      Worried About Running Out of Food in the Last Year:      Ran Out of Food in the Last Year:    Transportation Needs:      Lack of Transportation (Medical):      Lack of Transportation (Non-Medical):    Physical Activity:      Days of Exercise per Week:      Minutes of Exercise per Session:    Stress:      Feeling of Stress :    Social Connections:      Frequency of Communication with Friends and Family:      Frequency of Social Gatherings with Friends and Family:      Attends Spiritism Services:      Active Member of Clubs or Organizations:      Attends Club or Organization Meetings:      Marital Status:    Intimate Partner Violence:      Fear of Current or Ex-Partner:      Emotionally Abused:      Physically Abused:      Sexually Abused:        Family History   Problem Relation Age of Onset     Breast Cancer Mother 34     Coronary Artery Disease Father         s/p stent and bipass surgery; started at age 50     No Known Problems Brother         doesn't get medical care often     Breast Cancer Maternal Grandmother 54        and at 66       Current Outpatient Medications   Medication Sig Dispense Refill     vitamin D3 (CHOLECALCIFEROL) 2000 units (50 mcg) tablet Take 1 tablet (2,000 Units) by mouth daily          REVIEW OF SYSTEMS:  Refer to HPI; all other systems reviewed and negative.    PHYSICAL EXAM:  /74   Pulse 80   Temp 98.5  F (36.9  C) (Tympanic)   Resp 16   Ht 1.575 m (5' 2\")   Wt 70.3 kg (155 lb)   LMP 06/14/2021 (Approximate)   SpO2 98%   Breastfeeding No   BMI 28.35 kg/m    CONSTITUTIONAL:  Alert, cooperative, NAD.  EYES: No scleral icterus.  PERRLA.  Conjunctiva " clear.  ENT/MOUTH: External ears and nose normal.  TMs normal.  Moist mucous membranes. Oropharynx clear.    ENDO: No thyromegaly or thyroid nodules.  LYMPH:  No cervical or supraclavicular LA.    BREASTS: No skin abnormalities, no erythema.  No discrete masses.  No nipple discharge, no axillary, supra- or infraclavicular LA.   CARDIOVASCULAR: Regular, S1, S2.  No S3 or S4.  No murmur/gallop/rub.  No peripheral edema.  RESPIRATORY: CTA bilaterally, no wheezes, rhonchi or rales.  GI: Bowel sounds wnl.  Soft, nontender, non-distended.  No masses or HSM.  No rebound or guarding.  : Deferred for more detailed skin exam since not due for pap/pelvic.  MSKEL: Grossly normal ROM.  No clubbing.  INTEGUMENTARY:Warm, dry.  No rash noted on exposed skin.  Raised mole on posterolateral L upper arm; 4.5 x 2mm slightly raised hyperpigmented - almost heart shape; 5.5 x 5mm flat hyperpigmented papule.  Dermatofibroma on LLE/distal thigh.  NEUROLOGIC: Facies symmetric.  Grossly normal movement and tone.  No tremor.  PSYCHIATRIC: Affect normal.  Speech fluent.      PHQ 12/16/2019   PHQ-9 Total Score 0   Q9: Thoughts of better off dead/self-harm past 2 weeks Not at all     ELIZ-7 SCORE 12/16/2019   Total Score 0     PHQ-2 Score:   PHQ-2 ( 1999 Pfizer) 7/14/2021 2/6/2020   Q1: Little interest or pleasure in doing things 0 0   Q2: Feeling down, depressed or hopeless 0 0   PHQ-2 Score 0 0       No results found for any visits on 07/14/21.    ASSESSMENT/PLAN:  1. Routine history and physical examination of adult  - Basic Metabolic Panel; Future  - Lipid Panel; Future  - CBC and Differential; Future  - TSH Reflex GH; Future    2. Cervical cancer screening  Due in 5/2023    3. Lipid screening  - Lipid Panel; Future    4. Diabetes mellitus screening  - Basic Metabolic Panel; Future    5. Dermatofibroma of left lower extremity  Trial steroid cream to area BID x 2 weeks/one week off.  Repeat as necessary.  Discussed possible removal but risk  scarring/recurrence.  - triamcinolone (ARISTOCORT HP) 0.5 % external cream; Apply topically 2 times daily  Dispense: 15 g; Refill: 1      Relevant cancer screening discussed.    Counseled on healthy diet, Calcium and vitamin D intake, and exercise.    Fatoumata Puga, DO  Family Practice

## 2021-07-14 NOTE — NURSING NOTE
"Chief Complaint   Patient presents with     Physical       Initial /74   Pulse 80   Temp 98.5  F (36.9  C) (Tympanic)   Resp 16   Ht 1.575 m (5' 2\")   Wt 70.3 kg (155 lb)   LMP 06/14/2021 (Approximate)   SpO2 98%   Breastfeeding No   BMI 28.35 kg/m   Estimated body mass index is 28.35 kg/m  as calculated from the following:    Height as of this encounter: 1.575 m (5' 2\").    Weight as of this encounter: 70.3 kg (155 lb).  Medication Reconciliation: complete    Yanni Puga LPN     FOOD SECURITY SCREENING QUESTIONS  Hunger Vital Signs:  Within the past 12 months we worried whether our food would run out before we got money to buy more. Never  Within the past 12 months the food we bought just didn't last and we didn't have money to get more. Never  Yanni Puga LPN 7/14/2021 3:35 PM    "

## 2021-07-21 ENCOUNTER — LAB (OUTPATIENT)
Dept: LAB | Facility: OTHER | Age: 46
End: 2021-07-21
Attending: FAMILY MEDICINE
Payer: COMMERCIAL

## 2021-07-21 DIAGNOSIS — Z00.00 ROUTINE HISTORY AND PHYSICAL EXAMINATION OF ADULT: ICD-10-CM

## 2021-07-21 DIAGNOSIS — Z13.1 DIABETES MELLITUS SCREENING: ICD-10-CM

## 2021-07-21 DIAGNOSIS — Z13.220 LIPID SCREENING: ICD-10-CM

## 2021-07-21 LAB
ANION GAP SERPL CALCULATED.3IONS-SCNC: 8 MMOL/L (ref 3–14)
BASOPHILS # BLD AUTO: 0 10E3/UL (ref 0–0.2)
BASOPHILS NFR BLD AUTO: 0 %
BUN SERPL-MCNC: 14 MG/DL (ref 7–25)
CALCIUM SERPL-MCNC: 9.5 MG/DL (ref 8.6–10.3)
CHLORIDE BLD-SCNC: 103 MMOL/L (ref 98–107)
CHOLEST SERPL-MCNC: 158 MG/DL
CO2 SERPL-SCNC: 27 MMOL/L (ref 21–31)
CREAT SERPL-MCNC: 0.81 MG/DL (ref 0.6–1.2)
EOSINOPHIL # BLD AUTO: 0.1 10E3/UL (ref 0–0.7)
EOSINOPHIL NFR BLD AUTO: 1 %
ERYTHROCYTE [DISTWIDTH] IN BLOOD BY AUTOMATED COUNT: 12.7 % (ref 10–15)
FASTING STATUS PATIENT QL REPORTED: YES
GFR SERPL CREATININE-BSD FRML MDRD: 89 ML/MIN/1.73M2
GLUCOSE BLD-MCNC: 87 MG/DL (ref 70–105)
HCT VFR BLD AUTO: 44.1 % (ref 35–47)
HDLC SERPL-MCNC: 68 MG/DL (ref 23–92)
HGB BLD-MCNC: 15.3 G/DL (ref 11.7–15.7)
IMM GRANULOCYTES # BLD: 0 10E3/UL
IMM GRANULOCYTES NFR BLD: 0 %
LDLC SERPL CALC-MCNC: 79 MG/DL
LYMPHOCYTES # BLD AUTO: 3.3 10E3/UL (ref 0.8–5.3)
LYMPHOCYTES NFR BLD AUTO: 36 %
MCH RBC QN AUTO: 30.4 PG (ref 26.5–33)
MCHC RBC AUTO-ENTMCNC: 34.7 G/DL (ref 31.5–36.5)
MCV RBC AUTO: 88 FL (ref 78–100)
MONOCYTES # BLD AUTO: 0.7 10E3/UL (ref 0–1.3)
MONOCYTES NFR BLD AUTO: 8 %
NEUTROPHILS # BLD AUTO: 4.9 10E3/UL (ref 1.6–8.3)
NEUTROPHILS NFR BLD AUTO: 55 %
NONHDLC SERPL-MCNC: 90 MG/DL
NRBC # BLD AUTO: 0 10E3/UL
NRBC BLD AUTO-RTO: 0 /100
PLATELET # BLD AUTO: 343 10E3/UL (ref 150–450)
POTASSIUM BLD-SCNC: 4.6 MMOL/L (ref 3.5–5.1)
RBC # BLD AUTO: 5.04 10E6/UL (ref 3.8–5.2)
SODIUM SERPL-SCNC: 138 MMOL/L (ref 134–144)
TRIGL SERPL-MCNC: 53 MG/DL
TSH SERPL DL<=0.005 MIU/L-ACNC: 2.61 MU/L (ref 0.4–4)
WBC # BLD AUTO: 9.1 10E3/UL (ref 4–11)

## 2021-07-21 PROCEDURE — 85004 AUTOMATED DIFF WBC COUNT: CPT | Mod: ZL

## 2021-07-21 PROCEDURE — 84443 ASSAY THYROID STIM HORMONE: CPT | Mod: ZL

## 2021-07-21 PROCEDURE — 80048 BASIC METABOLIC PNL TOTAL CA: CPT | Mod: ZL

## 2021-07-21 PROCEDURE — 36415 COLL VENOUS BLD VENIPUNCTURE: CPT | Mod: ZL

## 2021-07-21 PROCEDURE — 80061 LIPID PANEL: CPT | Mod: ZL

## 2021-10-10 ENCOUNTER — HEALTH MAINTENANCE LETTER (OUTPATIENT)
Age: 46
End: 2021-10-10

## 2021-12-27 ENCOUNTER — LAB REQUISITION (OUTPATIENT)
Dept: LAB | Facility: OTHER | Age: 46
End: 2021-12-27

## 2021-12-27 DIAGNOSIS — Z11.52 ENCOUNTER FOR SCREENING FOR COVID-19: ICD-10-CM

## 2021-12-27 LAB
FLUAV RNA SPEC QL NAA+PROBE: POSITIVE
FLUBV RNA RESP QL NAA+PROBE: NEGATIVE
RSV RNA SPEC NAA+PROBE: NEGATIVE
SARS-COV-2 RNA RESP QL NAA+PROBE: NEGATIVE

## 2021-12-27 PROCEDURE — 87637 SARSCOV2&INF A&B&RSV AMP PRB: CPT | Performed by: FAMILY MEDICINE

## 2021-12-28 ENCOUNTER — E-VISIT (OUTPATIENT)
Dept: URGENT CARE | Facility: CLINIC | Age: 46
End: 2021-12-28
Payer: COMMERCIAL

## 2021-12-28 DIAGNOSIS — J11.1 INFLUENZA WITH RESPIRATORY MANIFESTATION OTHER THAN PNEUMONIA: Primary | ICD-10-CM

## 2021-12-28 PROCEDURE — 99421 OL DIG E/M SVC 5-10 MIN: CPT | Performed by: EMERGENCY MEDICINE

## 2021-12-28 RX ORDER — OSELTAMIVIR PHOSPHATE 75 MG/1
75 CAPSULE ORAL 2 TIMES DAILY
Qty: 10 CAPSULE | Refills: 0 | Status: SHIPPED | OUTPATIENT
Start: 2021-12-28 | End: 2022-01-02

## 2021-12-28 NOTE — PATIENT INSTRUCTIONS
Dear Yesy Garg    I did order Tamiflu, but start it ASAP today and get both doses in today. Needs to be started within the first 24-48 hrs  Thanks for choosing us as your health care partner,    Andrew Degroot MD

## 2022-01-11 NOTE — PROGRESS NOTES
ASSESSMENT/PLAN:     1. Left foot pain        Assessment & Plan  1.  Differential diagnosis of her symptoms have included trauma, repetitive trauma injury, gout, inflammatory arthritis, neuroma.  After reviewing labs and x-ray, neuroma seems to be the most likely diagnosis.  Patient education materials provided for her.  Referral to podiatry is made.  Problem List Items Addressed This Visit     None      Visit Diagnoses     Left foot pain    -  Primary    Relevant Orders    Uric acid (Completed)          Review of the result(s) of each unique test - I have personally reviewed the imaging test listed below.  I have personally reviewed the labs listed below.        PDMP Review     None                 YEIMY DELUNA MD, FAAFP  [facility]      NURSING NOTES:  Nursing Notes:   Dana Escobar LPN  1/18/2022  4:06 PM  Signed  Patient is here for pain in her left foot and toe. States has been on going for a couple months, has it daily, more when walking.     Patient's last menstrual period was 12/20/2021 (approximate).  Medication Reconciliation: complete    FOOD SECURITY SCREENING QUESTIONS  Hunger Vital Signs:  Within the past 12 months we worried whether our food would run out before we got money to buy more. Never  Within the past 12 months the food we bought just didn't last and we didn't have money to get more. Never  Dana Escobar LPN 1/18/2022 3:49 PM       Advance care directive on file? no  Advance care directive provided to patient? no       Dana Escobar LPN         SUBJECTIVE:    Yesy Garg is a 46 year old female  who presents for the following health issues:  Left foot pain.     HPI  Yesy Garg is a 46 year old female presents for evaluation of pain in her left foot.  This started almost 3 months ago.  It seems to be at the base of her second toe.  It seems to be a sharp pain that sometimes will go straight through her foot.  It is there with  "standing.  With exercising, which she does several times a week, the pain will get to a 6 out of 10 level.  End of October started on a higher protein diet.  She still is able to exercise with this.  She has worried about gout.    No known falls injury or trauma.    From [employer] message 1/5/22:  \" Rigo, Fatoumata!  I hope your holidays went well!  I have a concern about my left foot.  I have had this pain in the ball of my foot (but specifically at the base of my 2nd toe and radiates into my big toe).  I have had it for a couple of months now and is irritating enough to make me wear crocs or shoes in my house on my hard surfaces.  I feel the pain first in the ball of my foot but if you manipulate my toe the slightest bit it causes sharp quick pain.  It isn't red or swollen.  I don't recall hitting it or kicking anything to cause trauma.  The only thing I can think of is possible gout?  I started a new health program/lifestyle change to lose weight back in the end of October (high protein bars, shakes and others) and am wondering if that is the problem....gout.  What are your thoughts?  Should I come to see you for a quick visit or what do you think?  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   Please let me know what your thoughts are.  Thank you!\"    Allergies   Allergen Reactions     Augmentin Hives     Current Outpatient Medications   Medication     vitamin D3 (CHOLECALCIFEROL) 2000 units (50 mcg) tablet     No current facility-administered medications for this visit.      Past Medical History:   Diagnosis Date     Atypical mole     removed from face and L collarbone     Palpitations 2018    SVT on monitoring.  stress, caffiene - improved.        Past Surgical History:   Procedure Laterality Date     BIOPSY OF SKIN LESION      atypical on face and L collarbone     BREAST BIOPSY, RT/LT Left      HERNIA REPAIR, UMBILICAL N/A " ~2000       Review of Systems     PHQ-2 Score:     PHQ-2 ( 1999 [employer]) 1/18/2022 7/14/2021   Q1: Little interest or pleasure in doing things 0 0   Q2: Feeling down, depressed or hopeless 0 0   PHQ-2 Score 0 0   PHQ-2 Total Score (12-17 Years)- Positive if 3 or more points; Administer PHQ-A if positive - 0   Q1: Little interest or pleasure in doing things Not at all -   Q2: Feeling down, depressed or hopeless Not at all -   PHQ-2 Score 0 -         PHQ-9 SCORE 12/16/2019   PHQ-9 Total Score 0     ELIZ-7 SCORE 12/16/2019   Total Score 0           OBJECTIVE:     Objective  /66   Pulse 77   Temp 98.7  F (37.1  C) (Tympanic)   Resp 16   Wt 64.4 kg (142 lb)   LMP 12/20/2021 (Approximate)   SpO2 99%   Breastfeeding No   BMI 25.97 kg/m   Body mass index is 25.97 kg/m .    Wt Readings from Last 4 Encounters:   01/18/22 64.4 kg (142 lb)   07/14/21 70.3 kg (155 lb)   02/06/20 67.6 kg (149 lb)   02/03/20 68 kg (150 lb)       Nursing notes and VS reviewed    Physical Exam   GENERAL: healthy, alert and fit  Left foot: No erythema or edema.  Normal pulses.  Toenails appear normal.  Normal toe range of motion she is tender just lateral to the second MTP joint.          Results for orders placed or performed during the hospital encounter of 01/18/22   XR Foot Left G/E 3 Views     Status: None    Narrative    PROCEDURE:  XR FOOT LEFT G/E 3 VIEWS    HISTORY: Left foot pain.    COMPARISON:  None.    TECHNIQUE:  3 views left foot.    FINDINGS:  No fracture or dislocation is identified. The joint spaces  are preserved. Plantar calcaneal spurring is present. No foreign body  is seen.       Impression    IMPRESSION: No acute fracture.      VENU URENA MD         SYSTEM ID:  [id number]   Results for orders placed or performed in visit on 01/18/22   Uric acid     Status: Abnormal   Result Value Ref Range    Uric Acid 3.7 (L) 4.4 - 7.6 mg/dL                 Answers for HPI/ROS submitted by the patient on 1/18/2022  How many  servings of fruits and vegetables do you eat daily?: 2-3  On average, how many sweetened beverages do you drink each day (Examples: soda, juice, sweet tea, etc.  Do NOT count diet or artificially sweetened beverages)?: 0  How many minutes a day do you exercise enough to make your heart beat faster?: 30 to 60  How many days a week do you exercise enough to make your heart beat faster?: 3 or less  How many days per week do you miss taking your medication?: 0

## 2022-01-18 ENCOUNTER — OFFICE VISIT (OUTPATIENT)
Dept: FAMILY MEDICINE | Facility: OTHER | Age: 47
End: 2022-01-18
Attending: FAMILY MEDICINE
Payer: COMMERCIAL

## 2022-01-18 ENCOUNTER — HOSPITAL ENCOUNTER (OUTPATIENT)
Dept: GENERAL RADIOLOGY | Facility: OTHER | Age: 47
End: 2022-01-18
Attending: FAMILY MEDICINE
Payer: COMMERCIAL

## 2022-01-18 VITALS
WEIGHT: 142 LBS | DIASTOLIC BLOOD PRESSURE: 66 MMHG | OXYGEN SATURATION: 99 % | BODY MASS INDEX: 25.97 KG/M2 | TEMPERATURE: 98.7 F | HEART RATE: 77 BPM | SYSTOLIC BLOOD PRESSURE: 114 MMHG | RESPIRATION RATE: 16 BRPM

## 2022-01-18 DIAGNOSIS — M79.672 LEFT FOOT PAIN: ICD-10-CM

## 2022-01-18 DIAGNOSIS — M79.672 LEFT FOOT PAIN: Primary | ICD-10-CM

## 2022-01-18 DIAGNOSIS — D36.13 NEUROMA OF FOOT: ICD-10-CM

## 2022-01-18 LAB — URATE SERPL-MCNC: 3.7 MG/DL (ref 4.4–7.6)

## 2022-01-18 PROCEDURE — 84550 ASSAY OF BLOOD/URIC ACID: CPT | Mod: ZL | Performed by: FAMILY MEDICINE

## 2022-01-18 PROCEDURE — 99213 OFFICE O/P EST LOW 20 MIN: CPT | Performed by: FAMILY MEDICINE

## 2022-01-18 PROCEDURE — 36415 COLL VENOUS BLD VENIPUNCTURE: CPT | Mod: ZL | Performed by: FAMILY MEDICINE

## 2022-01-18 PROCEDURE — 73630 X-RAY EXAM OF FOOT: CPT | Mod: LT

## 2022-01-18 ASSESSMENT — PAIN SCALES - GENERAL: PAINLEVEL: MODERATE PAIN (4)

## 2022-01-18 NOTE — NURSING NOTE
Patient is here for pain in her left foot and toe. States has been on going for a couple months, has it daily, more when walking.     Patient's last menstrual period was 12/20/2021 (approximate).  Medication Reconciliation: complete    FOOD SECURITY SCREENING QUESTIONS  Hunger Vital Signs:  Within the past 12 months we worried whether our food would run out before we got money to buy more. Never  Within the past 12 months the food we bought just didn't last and we didn't have money to get more. Never  Dana Escobar LPN 1/18/2022 3:49 PM       Advance care directive on file? no  Advance care directive provided to patient? no       Dana Escobar LPN

## 2022-01-18 NOTE — PATIENT INSTRUCTIONS
Patient Education     What Are Neuromas of the Foot?  The ball of your foot is the bottom part just behind your toes. Bands of tissue (ligaments) connect the bones in the ball of your foot. Nerves run between the bones and underneath the ligaments. When a nerve or a number of small nerves become pinched, this causes them to swell and become painful because of the thickening of the tissue that surrounds the nerves. The painful, swollen nerve or cluster of nerves is called a neuroma (also called Peres neuroma).      A neuroma most often happens at the base of either the third and fourth toes or the second and third toes.   What causes a neuroma?  Runners often report this problem and describe pain as they push off from the starting block. Wearing tight or high-heeled shoes can cause a neuroma. Shoes that are too narrow or too pointed squeeze the bones in the ball of the foot. Shoes with high heels put extra pressure on the ends of the bones. When the bones are squeezed together, they pinch the nerve that runs between them.   Symptoms  The most common symptom of a neuroma is pain in the ball of the foot between two toes. The pain may be dull or sharp. It may feel as if you have a stone in your shoe. You may also have tingling or numbness in one or both of the toes. Symptoms may happen after you have been walking or standing for a while. Taking off your shoes and rubbing the ball of your foot may decrease or relieve the pain.   Preventing future problems  To prevent a future neuroma, buy shoes with plenty of room across the ball of the foot and in the toes. This keeps the bones from being squeezed together. Wearing low-heeled shoes (less than 2 inches) also puts less pressure on the bones and nerves in the ball of the foot.   [employer] last reviewed this educational content on 8/1/2020 2000-2021 [employer]. All rights reserved. This information is not intended as a substitute for professional medical  care. Always follow your healthcare professional's instructions.

## 2022-01-28 ENCOUNTER — HOSPITAL ENCOUNTER (OUTPATIENT)
Dept: MRI IMAGING | Facility: OTHER | Age: 47
Discharge: HOME | End: 2022-01-28
Attending: SURGERY | Admitting: SURGERY
Payer: COMMERCIAL

## 2022-01-28 DIAGNOSIS — Z12.39 BREAST CANCER SCREENING, HIGH RISK PATIENT: ICD-10-CM

## 2022-01-28 PROCEDURE — A9575 INJ GADOTERATE MEGLUMI 0.1ML: HCPCS | Performed by: SURGERY

## 2022-01-28 PROCEDURE — 255N000002 HC RX 255 OP 636: Performed by: SURGERY

## 2022-01-28 PROCEDURE — 77049 MRI BREAST C-+ W/CAD BI: CPT

## 2022-01-28 RX ORDER — GADOTERATE MEGLUMINE 376.9 MG/ML
15 INJECTION INTRAVENOUS ONCE
Status: COMPLETED | OUTPATIENT
Start: 2022-01-28 | End: 2022-01-28

## 2022-01-28 RX ADMIN — GADOTERATE MEGLUMINE 12 ML: 376.9 INJECTION INTRAVENOUS at 18:09

## 2022-02-17 ENCOUNTER — OFFICE VISIT (OUTPATIENT)
Dept: ORTHOPEDICS | Facility: OTHER | Age: 47
End: 2022-02-17
Attending: FAMILY MEDICINE
Payer: COMMERCIAL

## 2022-02-17 DIAGNOSIS — D36.13 NEUROMA OF FOOT: ICD-10-CM

## 2022-02-17 DIAGNOSIS — M79.672 LEFT FOOT PAIN: ICD-10-CM

## 2022-02-17 PROCEDURE — 20605 DRAIN/INJ JOINT/BURSA W/O US: CPT | Mod: LT | Performed by: PODIATRIST

## 2022-02-17 PROCEDURE — 250N000009 HC RX 250: Performed by: PODIATRIST

## 2022-02-17 PROCEDURE — 250N000011 HC RX IP 250 OP 636: Performed by: PODIATRIST

## 2022-02-17 PROCEDURE — 99203 OFFICE O/P NEW LOW 30 MIN: CPT | Mod: 25 | Performed by: PODIATRIST

## 2022-02-17 RX ORDER — TRIAMCINOLONE ACETONIDE 40 MG/ML
20 INJECTION, SUSPENSION INTRA-ARTICULAR; INTRAMUSCULAR ONCE
Status: COMPLETED | OUTPATIENT
Start: 2022-02-17 | End: 2022-02-17

## 2022-02-17 RX ORDER — LIDOCAINE HYDROCHLORIDE 10 MG/ML
1 INJECTION, SOLUTION EPIDURAL; INFILTRATION; INTRACAUDAL; PERINEURAL ONCE
Status: COMPLETED | OUTPATIENT
Start: 2022-02-17 | End: 2022-02-17

## 2022-02-17 RX ADMIN — TRIAMCINOLONE ACETONIDE 20 MG: 40 INJECTION, SUSPENSION INTRA-ARTICULAR; INTRAMUSCULAR at 15:12

## 2022-02-17 RX ADMIN — LIDOCAINE HYDROCHLORIDE 1 ML: 10 INJECTION, SOLUTION EPIDURAL; INFILTRATION; INTRACAUDAL; PERINEURAL at 15:12

## 2022-02-17 NOTE — PROGRESS NOTES
Patient is here for consult on her left foot pain.  Dena Bañuelos LPN .....................2/17/2022 2:22 PM

## 2022-02-17 NOTE — PROGRESS NOTES
Visit Date: 02/17/2022    Yesy is a patient familiar to myself.  We did a fibular fracture on her on her right side some time ago.  She works in day surgery.  She comes in today with left foot pain, thinks she has a neuroma.  She had some shooting pain from the dorsal aspect of her second MPJ, radiating proximally on the top side of her foot, seems to originate from plantar, however.  Here to have this evaluated and discuss options.  Done nothing significant to treat.  It has been going on for about 6 months.      HISTORY REVIEW:  I have reviewed this patient's past medical history, family history, social history as well as medications and allergies.  Any changes/additions were appropriately charted in the patient's electronic medical record.      PHYSICAL EXAMINATION:   CONSTITUTIONAL:  The patient is alert and oriented x3, well appearing and in no apparent distress.  Affect is pleasant and answers questions appropriately.  VASCULAR:  Circulation is intact with palpable pedal pulses and adequate capillary fill time to all digits.  Hair growth is present and appropriate to mid foot and digits.  NEUROLOGIC:  Light touch sensation is intact to digits.  There is a negative Tinel sign.  There are no signs of apparent nerve entrapment of superficial peroneal or common peroneal nerves.  INTEGUMENT:  No abnormal dermatologic lesions are noted.  Skin has normal texture and turgor.    MUSCULOSKELETAL:  Fairly rectus foot type.  Good motion of her ankle.  Good muscle strength.  Ambulatory in normal shoe without assistive device.  No pain with manipulation.  The second MPJ does not appear to be instability.  With plantar flexion, she does have some dorsal pain and some shooting pain, consistent with a possible capsulitis.  Has some pain with metatarsal squeeze, which recreates some symptoms consistent with neuroma.    IMAGING STUDIES:  She did have x-rays taken a month ago.  I did review these, they are nonweightbearing.   There are no acute concerns.  Rather rectus foot type.    ASSESSMENT:  Possible second interspace neuroma versus second metatarsophalangeal joint capsulitis.    PLAN:  I discussed condition and treatment with the patient today.  We did discuss appropriate conservative cares, wider shoe gear.  Supportive shoe gear was discussed.  We gave her a metatarsal pad to see if we can distribute pressure a little bit differently and alleviate some of these symptoms.  We did a neuroma injection proximal to the MPJ.  I do not want to cause laxity of these ligaments or damage these ligaments.  We attempted a neuroma injection for diagnostic therapeutic purposes.  We will see what kind of relief this provides.  With ongoing pain, consider an MRI of the second MPJ.    A 20-minute consultation.    PROCEDURE PERFORMED:  The patient's dorsal foot was prepped with alcohol and injected in between second and third metatarsal necks with 0.5 mL of Kenalog 40, 1 mL of 1% lidocaine plain in and around the proper plantar digital nerve.  Procedure tolerated well.  Sterile Band-Aid applied.    Jatinder Foster DPM        D: 2022   T: 2022   MT: [id number]    Name:     APPLE ALMAGUER  MRN:      [id number]        Account:    [id number]   :      1975           Visit Date: 2022     Document: [id number]

## 2022-05-03 ENCOUNTER — LAB REQUISITION (OUTPATIENT)
Dept: LAB | Facility: OTHER | Age: 47
End: 2022-05-03

## 2022-05-03 DIAGNOSIS — Z11.52 ENCOUNTER FOR SCREENING FOR COVID-19: ICD-10-CM

## 2022-05-03 LAB
FLUAV RNA SPEC QL NAA+PROBE: NEGATIVE
FLUBV RNA RESP QL NAA+PROBE: NEGATIVE
RSV RNA SPEC NAA+PROBE: NEGATIVE
SARS-COV-2 RNA RESP QL NAA+PROBE: POSITIVE

## 2022-05-03 PROCEDURE — 87637 SARSCOV2&INF A&B&RSV AMP PRB: CPT | Performed by: FAMILY MEDICINE

## 2022-05-12 ENCOUNTER — OFFICE VISIT (OUTPATIENT)
Dept: SURGERY | Facility: OTHER | Age: 47
End: 2022-05-12
Attending: SURGERY
Payer: COMMERCIAL

## 2022-05-12 VITALS
SYSTOLIC BLOOD PRESSURE: 100 MMHG | WEIGHT: 142 LBS | RESPIRATION RATE: 16 BRPM | HEIGHT: 62 IN | TEMPERATURE: 98.1 F | DIASTOLIC BLOOD PRESSURE: 78 MMHG | HEART RATE: 74 BPM | BODY MASS INDEX: 26.13 KG/M2 | OXYGEN SATURATION: 99 %

## 2022-05-12 DIAGNOSIS — L98.9 SKIN LESION OF LEFT LEG: Primary | ICD-10-CM

## 2022-05-12 PROCEDURE — 88305 TISSUE EXAM BY PATHOLOGIST: CPT

## 2022-05-12 PROCEDURE — 11401 EXC TR-EXT B9+MARG 0.6-1 CM: CPT | Performed by: SURGERY

## 2022-05-12 ASSESSMENT — PAIN SCALES - GENERAL: PAINLEVEL: NO PAIN (0)

## 2022-05-12 NOTE — PROGRESS NOTES
SUBJECTIVE:  47 year old female presents for lesion removal from her left thigh. This lesion has been present for a couple of years and keeps getting bigger.    OBJECTIVE:  /78 (BP Location: Right arm, Patient Position: Sitting, Cuff Size: Adult Regular)   Pulse 74   Temp 98.1  F (36.7  C) (Tympanic)   Resp 16   Wt 64.4 kg (142 lb)   SpO2 99%   BMI 25.97 kg/m    General: no acute distress  Skin: left thigh with 0.6 x 0.5 x 0.2 cm firm rubbery skin nodule    ASSESSMENT:  Lesion size: as above  Defect size: lesion and margin : 1.0 x 0.6 x 0.4 cm -simple closure    PROCEDURE:  The pathophysiology of skin lesions, nodules and skin cancers was discussed with the patient. The risks, benefits and alternatives to excision of the lesion were discussed with the patient, including the risks of infection,scarring, bruising, bleeding and the possible need for further procedures. The patient expressed understanding and wishes to proceed.    Chloraprep was used to cleanse the skin in the area of the lesion. 1% Lidocaine with epinephrine was infiltrated in the skin and subcutaneous tissue in the area of the lesion. When appropriate anesthesia had been achieved, the lesion was sharply excised with a margin of grossly normal tissue. The wound was closed using 4-0 Monocryl. Sterile dressing was applied. The specimen was labelled and sent to pathology for evaluation. The procedure was well tolerated without complications. Patient was given post procedure instructions and denied further questions. We will call the patient with pathology results.

## 2022-05-12 NOTE — PATIENT INSTRUCTIONS
Your incision was closed with stitches that will disolve. It is ok to remove the dressing and get the incision wet in the shower on the day after your procedure. Don't soak in a tub, pool or lake for 5 days. The small butterfly strips will start to peel off in 5-7 days it is ok to remove them. We will call you with results. If you have concerns, please call.   We will call with results of the pathology.

## 2022-05-12 NOTE — NURSING NOTE
"Chief Complaint   Patient presents with     Procedure     Left upper thigh lesion       Initial /78 (BP Location: Right arm, Patient Position: Sitting, Cuff Size: Adult Regular)   Pulse 74   Temp 98.1  F (36.7  C) (Tympanic)   Resp 16   Wt 64.4 kg (142 lb)   SpO2 99%   BMI 25.97 kg/m   Estimated body mass index is 25.97 kg/m  as calculated from the following:    Height as of 7/14/21: 1.575 m (5' 2\").    Weight as of this encounter: 64.4 kg (142 lb).  Medication Reconciliation: complete    Prior to the start of the procedure and with procedural staff participation, I verbally confirmed the patient s identity using two indicators, relevant allergies, that the procedure was appropriate and matched the consent or emergent situation, and that the correct equipment/implants were available. Immediately prior to starting the procedure I conducted the Time Out with the procedural staff and re-confirmed the patient s name, procedure, and site/side. (The Joint Commission universal protocol was followed.)  Yes    Sedation (Moderate or Deep): None  Chuyita Bowie LPN .......5/12/2022 8:43 AM  "

## 2022-05-19 LAB
PATH REPORT.COMMENTS IMP SPEC: NORMAL
PATH REPORT.FINAL DX SPEC: NORMAL
PHOTO IMAGE: NORMAL

## 2022-08-01 ENCOUNTER — HOSPITAL ENCOUNTER (OUTPATIENT)
Dept: MAMMOGRAPHY | Facility: OTHER | Age: 47
Discharge: HOME | End: 2022-08-01
Attending: FAMILY MEDICINE | Admitting: FAMILY MEDICINE
Payer: COMMERCIAL

## 2022-08-01 DIAGNOSIS — Z12.31 VISIT FOR SCREENING MAMMOGRAM: ICD-10-CM

## 2022-08-01 PROCEDURE — 77067 SCR MAMMO BI INCL CAD: CPT

## 2022-09-18 ENCOUNTER — HEALTH MAINTENANCE LETTER (OUTPATIENT)
Age: 47
End: 2022-09-18

## 2022-09-20 ENCOUNTER — TELEPHONE (OUTPATIENT)
Dept: FAMILY MEDICINE | Facility: OTHER | Age: 47
End: 2022-09-20

## 2022-09-20 NOTE — TELEPHONE ENCOUNTER
Patient called saying she had left a message yesterday 9/19/22 - unsure if she needs an appointment with PCP Fatoumata Puga, mentioned she needs a few things set up. Please advise.    Thank you!    Radha Sullivan on 9/20/2022 at 2:00 PM

## 2022-09-22 ENCOUNTER — MYC MEDICAL ADVICE (OUTPATIENT)
Dept: FAMILY MEDICINE | Facility: OTHER | Age: 47
End: 2022-09-22

## 2022-09-22 DIAGNOSIS — Z12.11 COLON CANCER SCREENING: Primary | ICD-10-CM

## 2023-01-03 ENCOUNTER — MYC MEDICAL ADVICE (OUTPATIENT)
Dept: FAMILY MEDICINE | Facility: OTHER | Age: 48
End: 2023-01-03

## 2023-01-03 DIAGNOSIS — M67.431 GANGLION CYST OF WRIST, RIGHT: Primary | ICD-10-CM

## 2023-01-11 DIAGNOSIS — M67.431 GANGLION OF RIGHT WRIST: Primary | ICD-10-CM

## 2023-02-01 DIAGNOSIS — Z12.39 BREAST CANCER SCREENING, HIGH RISK PATIENT: Primary | ICD-10-CM

## 2023-02-01 DIAGNOSIS — Z80.3 FAMILY HISTORY OF BREAST CANCER IN MOTHER: ICD-10-CM

## 2023-02-09 ENCOUNTER — HOSPITAL ENCOUNTER (OUTPATIENT)
Facility: OTHER | Age: 48
End: 2023-02-09
Attending: SURGERY | Admitting: SURGERY
Payer: COMMERCIAL

## 2023-02-23 ENCOUNTER — HOSPITAL ENCOUNTER (OUTPATIENT)
Dept: MRI IMAGING | Facility: OTHER | Age: 48
Discharge: HOME | End: 2023-02-23
Attending: SURGERY | Admitting: SURGERY
Payer: COMMERCIAL

## 2023-02-23 DIAGNOSIS — Z12.39 BREAST CANCER SCREENING, HIGH RISK PATIENT: ICD-10-CM

## 2023-02-23 DIAGNOSIS — Z80.3 FAMILY HISTORY OF BREAST CANCER IN MOTHER: ICD-10-CM

## 2023-02-23 PROCEDURE — 255N000002 HC RX 255 OP 636: Performed by: SURGERY

## 2023-02-23 PROCEDURE — A9575 INJ GADOTERATE MEGLUMI 0.1ML: HCPCS | Performed by: SURGERY

## 2023-02-23 PROCEDURE — 77049 MRI BREAST C-+ W/CAD BI: CPT

## 2023-02-23 RX ADMIN — GADOTERATE MEGLUMINE 13 ML: 376.9 INJECTION INTRAVENOUS at 16:27

## 2023-02-27 ENCOUNTER — MYC MEDICAL ADVICE (OUTPATIENT)
Dept: FAMILY MEDICINE | Facility: OTHER | Age: 48
End: 2023-02-27
Payer: COMMERCIAL

## 2023-03-06 NOTE — PROGRESS NOTES
Assessment & Plan     1. Palpitations  Prior Holter monitor through [place] reviewed, 1 episode of 6 beats of SVT, otherwise normal.  EKG updated in clinic today showing sinus bradycardia, otherwise normal.  Vitals and exam unremarkable.  Lab work, Zio patch pending as below.  Will notify with results and discuss treatment at that time.  - CBC and Differential; Future  - Basic Metabolic Panel; Future  - TSH Reflex GH; Future  - Magnesium; Future  - EKG 12-lead, tracing only  - Adult Leadless EKG Monitor 8 to 14 Days; Future      Return if symptoms worsen or fail to improve.    Aliya Nova PA-C  [facility]    Subjective   Yesy is a 47 year old, presenting for the following health issues:  Palpitations      History of Present Illness       Reason for visit:  Palpitations  Symptom onset:  3-4 weeks ago  Symptoms include:  Palpitations and chest heaviness while having palps  Symptom intensity:  Moderate  Symptom progression:  Staying the same  Had these symptoms before:  Yes  Has tried/received treatment for these symptoms:  No  What makes it worse:  No  What makes it better:  No    She eats 4 or more servings of fruits and vegetables daily.She consumes 0 sweetened beverage(s) daily.She exercises with enough effort to increase her heart rate 30 to 60 minutes per day.  She exercises with enough effort to increase her heart rate 4 days per week. She is missing 2 dose(s) of medications per week.  She is not taking prescribed medications regularly due to remembering to take.     Here for evaluation of palpitations.  Does report history of some palpitations, chest pain, shortness of breath a few years ago when she was living in [place].  Reviewed Holter monitor results showing 1 episode of 6 beats of SVT.  No other abnormalities were noted.  Over the past couple weeks she has again noticed increasing palpitations.  No known triggers.  Happens sporadically throughout the day.  Feels uneasy.  No  "chest pain or pressure, lightheadedness, dizziness, syncope, headaches, vision changes, shortness of breath.    PAST MEDICAL HISTORY:   Past Medical History:   Diagnosis Date     Atypical mole     removed from face and L collarbone     Breast cancer screening, high risk patient      Family history of breast cancer in mother      Palpitations 2018    SVT on monitoring.  stress, caffiene - improved.         PAST SURGICAL HISTORY:   Past Surgical History:   Procedure Laterality Date     BIOPSY OF SKIN LESION      atypical on face and L collarbone     BREAST BIOPSY, RT/LT Left      HERNIA REPAIR, UMBILICAL N/A ~2000       FAMILY HISTORY:   Family History   Problem Relation Age of Onset     Breast Cancer Mother 34     Coronary Artery Disease Father         s/p stent and bipass surgery; started at age 50     No Known Problems Brother         doesn't get medical care often     Breast Cancer Maternal Grandmother 54        and at 66       SOCIAL HISTORY:   Social History     Tobacco Use     Smoking status: Some Days     Smokeless tobacco: Never   Substance Use Topics     Alcohol use: Yes     Comment: a couple drinks a week, more in the summer        Allergies   Allergen Reactions     Augmentin Hives     Current Outpatient Medications   Medication     vitamin D3 (CHOLECALCIFEROL) 2000 units (50 mcg) tablet     No current facility-administered medications for this visit.         Review of Systems   Per HPI        Objective    /78   Pulse 66   Temp 98.7  F (37.1  C)   Resp 12   Ht 1.575 m (5' 2\")   Wt 71.8 kg (158 lb 3.2 oz)   LMP 02/16/2023 (Exact Date)   SpO2 98%   BMI 28.94 kg/m    Body mass index is 28.94 kg/m .  Physical Exam   General: Pleasant, in no apparent distress.  Cardiovascular: Regular rate and rhythm with S1 equal to S2. No murmurs, friction rubs, or gallops.   Respiratory: Lungs are resonant and clear to auscultation bilaterally. No wheezes, crackles, or rhonchi.  Psych: Appropriate mood and " affect.

## 2023-03-07 ENCOUNTER — OFFICE VISIT (OUTPATIENT)
Dept: FAMILY MEDICINE | Facility: OTHER | Age: 48
End: 2023-03-07
Attending: PHYSICIAN ASSISTANT
Payer: COMMERCIAL

## 2023-03-07 VITALS
OXYGEN SATURATION: 98 % | HEIGHT: 62 IN | SYSTOLIC BLOOD PRESSURE: 122 MMHG | RESPIRATION RATE: 12 BRPM | DIASTOLIC BLOOD PRESSURE: 78 MMHG | WEIGHT: 158.2 LBS | TEMPERATURE: 98.7 F | HEART RATE: 66 BPM | BODY MASS INDEX: 29.11 KG/M2

## 2023-03-07 DIAGNOSIS — R00.2 PALPITATIONS: Primary | ICD-10-CM

## 2023-03-07 LAB
ANION GAP SERPL CALCULATED.3IONS-SCNC: 8 MMOL/L (ref 7–15)
BASOPHILS # BLD AUTO: 0.1 10E3/UL (ref 0–0.2)
BASOPHILS NFR BLD AUTO: 1 %
BUN SERPL-MCNC: 15.1 MG/DL (ref 6–20)
CALCIUM SERPL-MCNC: 9.4 MG/DL (ref 8.6–10)
CHLORIDE SERPL-SCNC: 103 MMOL/L (ref 98–107)
CREAT SERPL-MCNC: 0.71 MG/DL (ref 0.51–0.95)
DEPRECATED HCO3 PLAS-SCNC: 26 MMOL/L (ref 22–29)
EOSINOPHIL # BLD AUTO: 0.1 10E3/UL (ref 0–0.7)
EOSINOPHIL NFR BLD AUTO: 1 %
ERYTHROCYTE [DISTWIDTH] IN BLOOD BY AUTOMATED COUNT: 12.3 % (ref 10–15)
GFR SERPL CREATININE-BSD FRML MDRD: >90 ML/MIN/1.73M2
GLUCOSE SERPL-MCNC: 92 MG/DL (ref 70–99)
HCT VFR BLD AUTO: 42.4 % (ref 35–47)
HGB BLD-MCNC: 14.8 G/DL (ref 11.7–15.7)
IMM GRANULOCYTES # BLD: 0 10E3/UL
IMM GRANULOCYTES NFR BLD: 0 %
LYMPHOCYTES # BLD AUTO: 3.4 10E3/UL (ref 0.8–5.3)
LYMPHOCYTES NFR BLD AUTO: 32 %
MAGNESIUM SERPL-MCNC: 2 MG/DL (ref 1.7–2.3)
MCH RBC QN AUTO: 30.5 PG (ref 26.5–33)
MCHC RBC AUTO-ENTMCNC: 34.9 G/DL (ref 31.5–36.5)
MCV RBC AUTO: 87 FL (ref 78–100)
MONOCYTES # BLD AUTO: 0.8 10E3/UL (ref 0–1.3)
MONOCYTES NFR BLD AUTO: 7 %
NEUTROPHILS # BLD AUTO: 6.4 10E3/UL (ref 1.6–8.3)
NEUTROPHILS NFR BLD AUTO: 59 %
NRBC # BLD AUTO: 0 10E3/UL
NRBC BLD AUTO-RTO: 0 /100
PLATELET # BLD AUTO: 338 10E3/UL (ref 150–450)
POTASSIUM SERPL-SCNC: 4.4 MMOL/L (ref 3.4–5.3)
RBC # BLD AUTO: 4.85 10E6/UL (ref 3.8–5.2)
SODIUM SERPL-SCNC: 137 MMOL/L (ref 136–145)
TSH SERPL DL<=0.005 MIU/L-ACNC: 3.55 UIU/ML (ref 0.3–4.2)
WBC # BLD AUTO: 10.8 10E3/UL (ref 4–11)

## 2023-03-07 PROCEDURE — 93000 ELECTROCARDIOGRAM COMPLETE: CPT | Performed by: INTERNAL MEDICINE

## 2023-03-07 PROCEDURE — 83735 ASSAY OF MAGNESIUM: CPT | Mod: ZL | Performed by: PHYSICIAN ASSISTANT

## 2023-03-07 PROCEDURE — 84443 ASSAY THYROID STIM HORMONE: CPT | Mod: ZL | Performed by: PHYSICIAN ASSISTANT

## 2023-03-07 PROCEDURE — 80048 BASIC METABOLIC PNL TOTAL CA: CPT | Mod: ZL | Performed by: PHYSICIAN ASSISTANT

## 2023-03-07 PROCEDURE — 36415 COLL VENOUS BLD VENIPUNCTURE: CPT | Mod: ZL | Performed by: PHYSICIAN ASSISTANT

## 2023-03-07 PROCEDURE — 85025 COMPLETE CBC W/AUTO DIFF WBC: CPT | Mod: ZL | Performed by: PHYSICIAN ASSISTANT

## 2023-03-07 PROCEDURE — 99213 OFFICE O/P EST LOW 20 MIN: CPT | Performed by: PHYSICIAN ASSISTANT

## 2023-03-07 ASSESSMENT — PAIN SCALES - GENERAL: PAINLEVEL: NO PAIN (0)

## 2023-03-07 NOTE — NURSING NOTE
Patient presents to clinic with concerns about heart palpitations. She has had this in the past.  Yamini Pitts LPN ....................  3/7/2023   2:55 PM

## 2023-03-07 NOTE — NURSING NOTE
Patient presents to clinic with palpitations.  Yamini Pitts LPN ....................  3/7/2023   2:49 PM

## 2023-03-09 LAB
ATRIAL RATE - MUSE: 56 BPM
DIASTOLIC BLOOD PRESSURE - MUSE: NORMAL MMHG
INTERPRETATION ECG - MUSE: NORMAL
P AXIS - MUSE: -11 DEGREES
PR INTERVAL - MUSE: 134 MS
QRS DURATION - MUSE: 82 MS
QT - MUSE: 400 MS
QTC - MUSE: 386 MS
R AXIS - MUSE: 52 DEGREES
SYSTOLIC BLOOD PRESSURE - MUSE: NORMAL MMHG
T AXIS - MUSE: 38 DEGREES
VENTRICULAR RATE- MUSE: 56 BPM

## 2023-03-28 ENCOUNTER — MYC MEDICAL ADVICE (OUTPATIENT)
Dept: FAMILY MEDICINE | Facility: OTHER | Age: 48
End: 2023-03-28
Payer: COMMERCIAL

## 2023-04-06 RX ORDER — BIOTIN 10000 MCG
1 CAPSULE ORAL DAILY
COMMUNITY

## 2023-04-10 DIAGNOSIS — Z12.11 ENCOUNTER FOR SCREENING COLONOSCOPY: Primary | ICD-10-CM

## 2023-04-10 RX ORDER — BISACODYL 5 MG/1
TABLET, DELAYED RELEASE ORAL
Qty: 2 TABLET | Refills: 0 | Status: ON HOLD | OUTPATIENT
Start: 2023-04-10 | End: 2023-04-13

## 2023-04-10 RX ORDER — POLYETHYLENE GLYCOL 3350, SODIUM CHLORIDE, SODIUM BICARBONATE, POTASSIUM CHLORIDE 420; 11.2; 5.72; 1.48 G/4L; G/4L; G/4L; G/4L
4000 POWDER, FOR SOLUTION ORAL ONCE
Qty: 4000 ML | Refills: 0 | Status: SHIPPED | OUTPATIENT
Start: 2023-04-10 | End: 2023-04-10

## 2023-04-13 ENCOUNTER — ANESTHESIA (OUTPATIENT)
Dept: SURGERY | Facility: OTHER | Age: 48
End: 2023-04-13
Payer: COMMERCIAL

## 2023-04-13 ENCOUNTER — ANESTHESIA EVENT (OUTPATIENT)
Dept: SURGERY | Facility: OTHER | Age: 48
End: 2023-04-13
Payer: COMMERCIAL

## 2023-04-13 ENCOUNTER — HOSPITAL ENCOUNTER (OUTPATIENT)
Facility: OTHER | Age: 48
Discharge: HOME | End: 2023-04-13
Attending: SURGERY | Admitting: SURGERY
Payer: COMMERCIAL

## 2023-04-13 VITALS
TEMPERATURE: 98.1 F | HEART RATE: 77 BPM | BODY MASS INDEX: 27.62 KG/M2 | WEIGHT: 151 LBS | RESPIRATION RATE: 12 BRPM | SYSTOLIC BLOOD PRESSURE: 117 MMHG | OXYGEN SATURATION: 98 % | DIASTOLIC BLOOD PRESSURE: 90 MMHG

## 2023-04-13 PROCEDURE — G0121 COLON CA SCRN NOT HI RSK IND: HCPCS | Performed by: SURGERY

## 2023-04-13 PROCEDURE — 45378 DIAGNOSTIC COLONOSCOPY: CPT | Performed by: SURGERY

## 2023-04-13 PROCEDURE — 250N000011 HC RX IP 250 OP 636: Performed by: NURSE ANESTHETIST, CERTIFIED REGISTERED

## 2023-04-13 PROCEDURE — 250N000009 HC RX 250: Performed by: NURSE ANESTHETIST, CERTIFIED REGISTERED

## 2023-04-13 PROCEDURE — 45378 DIAGNOSTIC COLONOSCOPY: CPT | Performed by: NURSE ANESTHETIST, CERTIFIED REGISTERED

## 2023-04-13 PROCEDURE — 999N000010 HC STATISTIC ANES STAT CODE-CRNA PER MINUTE: Performed by: SURGERY

## 2023-04-13 PROCEDURE — 258N000003 HC RX IP 258 OP 636: Performed by: SURGERY

## 2023-04-13 RX ORDER — SODIUM CHLORIDE, SODIUM LACTATE, POTASSIUM CHLORIDE, CALCIUM CHLORIDE 600; 310; 30; 20 MG/100ML; MG/100ML; MG/100ML; MG/100ML
INJECTION, SOLUTION INTRAVENOUS CONTINUOUS
Status: DISCONTINUED | OUTPATIENT
Start: 2023-04-13 | End: 2023-04-13 | Stop reason: HOSPADM

## 2023-04-13 RX ORDER — PROPOFOL 10 MG/ML
INJECTION, EMULSION INTRAVENOUS CONTINUOUS PRN
Status: DISCONTINUED | OUTPATIENT
Start: 2023-04-13 | End: 2023-04-13

## 2023-04-13 RX ORDER — LIDOCAINE HYDROCHLORIDE 20 MG/ML
INJECTION, SOLUTION INFILTRATION; PERINEURAL PRN
Status: DISCONTINUED | OUTPATIENT
Start: 2023-04-13 | End: 2023-04-13

## 2023-04-13 RX ORDER — NALOXONE HYDROCHLORIDE 0.4 MG/ML
0.4 INJECTION, SOLUTION INTRAMUSCULAR; INTRAVENOUS; SUBCUTANEOUS
Status: DISCONTINUED | OUTPATIENT
Start: 2023-04-13 | End: 2023-04-13 | Stop reason: HOSPADM

## 2023-04-13 RX ORDER — LIDOCAINE 40 MG/G
CREAM TOPICAL
Status: DISCONTINUED | OUTPATIENT
Start: 2023-04-13 | End: 2023-04-13 | Stop reason: HOSPADM

## 2023-04-13 RX ORDER — NALOXONE HYDROCHLORIDE 0.4 MG/ML
0.2 INJECTION, SOLUTION INTRAMUSCULAR; INTRAVENOUS; SUBCUTANEOUS
Status: DISCONTINUED | OUTPATIENT
Start: 2023-04-13 | End: 2023-04-13 | Stop reason: HOSPADM

## 2023-04-13 RX ORDER — PROPOFOL 10 MG/ML
INJECTION, EMULSION INTRAVENOUS PRN
Status: DISCONTINUED | OUTPATIENT
Start: 2023-04-13 | End: 2023-04-13

## 2023-04-13 RX ORDER — POLYETHYLENE GLYCOL 3350, SODIUM CHLORIDE, SODIUM BICARBONATE, POTASSIUM CHLORIDE 420; 11.2; 5.72; 1.48 G/4L; G/4L; G/4L; G/4L
4000 POWDER, FOR SOLUTION ORAL ONCE
Status: ON HOLD | COMMUNITY
Start: 2023-04-10 | End: 2023-04-13

## 2023-04-13 RX ORDER — FLUMAZENIL 0.1 MG/ML
0.2 INJECTION, SOLUTION INTRAVENOUS
Status: DISCONTINUED | OUTPATIENT
Start: 2023-04-13 | End: 2023-04-13 | Stop reason: HOSPADM

## 2023-04-13 RX ADMIN — PROPOFOL 30 MG: 10 INJECTION, EMULSION INTRAVENOUS at 08:08

## 2023-04-13 RX ADMIN — SODIUM CHLORIDE, POTASSIUM CHLORIDE, SODIUM LACTATE AND CALCIUM CHLORIDE: 600; 310; 30; 20 INJECTION, SOLUTION INTRAVENOUS at 07:59

## 2023-04-13 RX ADMIN — LIDOCAINE HYDROCHLORIDE 40 MG: 20 INJECTION, SOLUTION INFILTRATION; PERINEURAL at 08:06

## 2023-04-13 RX ADMIN — PROPOFOL 140 MCG/KG/MIN: 10 INJECTION, EMULSION INTRAVENOUS at 08:06

## 2023-04-13 RX ADMIN — PROPOFOL 60 MG: 10 INJECTION, EMULSION INTRAVENOUS at 08:06

## 2023-04-13 ASSESSMENT — LIFESTYLE VARIABLES: TOBACCO_USE: 1

## 2023-04-13 ASSESSMENT — ACTIVITIES OF DAILY LIVING (ADL): ADLS_ACUITY_SCORE: 35

## 2023-04-13 NOTE — ANESTHESIA PREPROCEDURE EVALUATION
Anesthesia Pre-Procedure Evaluation    Patient: Yesy Garg   MRN: [id number] : 1975        Procedure : Procedure(s):  Colonoscopy          Past Medical History:   Diagnosis Date     Atypical mole     removed from face and L collarbone     Breast cancer screening, high risk patient      Family history of breast cancer in mother      Palpitations 2018    SVT on monitoring.  stress, caffiene - improved.        Past Surgical History:   Procedure Laterality Date     BIOPSY OF SKIN LESION      atypical on face and L collarbone     BREAST BIOPSY, RT/LT Left      HERNIA REPAIR, UMBILICAL N/A ~      Allergies   Allergen Reactions     Augmentin Hives      Social History     Tobacco Use     Smoking status: Some Days     Smokeless tobacco: Never   Vaping Use     Vaping status: Never Used     Passive vaping exposure: Yes   Substance Use Topics     Alcohol use: Yes     Comment: a couple drinks a week, more in the summer      Wt Readings from Last 1 Encounters:   23 68.5 kg (151 lb)        Anesthesia Evaluation   Pt has had prior anesthetic.         ROS/MED HX  ENT/Pulmonary: Comment: Infrequent smoker    (+) DONNA risk factors, snores loudly, tobacco use,     Neurologic:  - neg neurologic ROS     Cardiovascular:  - neg cardiovascular ROS     METS/Exercise Tolerance: >4 METS    Hematologic:  - neg hematologic  ROS     Musculoskeletal:  - neg musculoskeletal ROS     GI/Hepatic: Comment: Infrequent heartburn takes tums prn    (+) bowel prep,     Renal/Genitourinary:  - neg Renal ROS     Endo:  - neg endo ROS     Psychiatric/Substance Use:  - neg psychiatric ROS     Infectious Disease:  - neg infectious disease ROS     Malignancy:  - neg malignancy ROS     Other:  - neg other ROS          Physical Exam    Airway        Mallampati: I   TM distance: > 3 FB   Neck ROM: full   Mouth opening: > 3 cm    Respiratory Devices and Support         Dental       (+) Completely normal teeth      Cardiovascular   cardiovascular  exam normal          Pulmonary   pulmonary exam normal                OUTSIDE LABS:  CBC:   Lab Results   Component Value Date    WBC 10.8 03/07/2023    WBC 9.1 07/21/2021    HGB 14.8 03/07/2023    HGB 15.3 07/21/2021    HCT 42.4 03/07/2023    HCT 44.1 07/21/2021     03/07/2023     07/21/2021     BMP:   Lab Results   Component Value Date     03/07/2023     07/21/2021    POTASSIUM 4.4 03/07/2023    POTASSIUM 4.6 07/21/2021    CHLORIDE 103 03/07/2023    CHLORIDE 103 07/21/2021    CO2 26 03/07/2023    CO2 27 07/21/2021    BUN 15.1 03/07/2023    BUN 14 07/21/2021    CR 0.71 03/07/2023    CR 0.81 07/21/2021    GLC 92 03/07/2023    GLC 87 07/21/2021     COAGS: No results found for: PTT, INR, FIBR  POC:   Lab Results   Component Value Date    HCG Negative 02/06/2020     HEPATIC: No results found for: ALBUMIN, PROTTOTAL, ALT, AST, GGT, ALKPHOS, BILITOTAL, BILIDIRECT, OLIVIA  OTHER:   Lab Results   Component Value Date    BRAD 9.4 03/07/2023    MAG 2.0 03/07/2023    TSH 3.55 03/07/2023       Anesthesia Plan    ASA Status:  1   NPO Status:  NPO Appropriate    Anesthesia Type: MAC.     - Reason for MAC: straight local not clinically adequate              Consents    Anesthesia Plan(s) and associated risks, benefits, and realistic alternatives discussed. Questions answered and patient/representative(s) expressed understanding.     - Discussed: Risks, Benefits and Alternatives for BOTH SEDATION and the PROCEDURE were discussed     - Discussed with:  Patient         Postoperative Care            Comments:                FLASH BURTON CRNA

## 2023-04-13 NOTE — ANESTHESIA POSTPROCEDURE EVALUATION
Patient: Yesy Garg    Procedure: Procedure(s):  Colonoscopy       Anesthesia Type:  MAC    Note:  Disposition: Outpatient   Postop Pain Control: Uneventful            Sign Out: Well controlled pain   PONV: No   Neuro/Psych: Uneventful            Sign Out: Acceptable/Baseline neuro status   Airway/Respiratory: Uneventful            Sign Out: Acceptable/Baseline resp. status   CV/Hemodynamics: Uneventful            Sign Out: Acceptable CV status; No obvious hypovolemia; No obvious fluid overload   Other NRE: NONE   DID A NON-ROUTINE EVENT OCCUR?            Last vitals:  Vitals Value Taken Time   /90 04/13/23 0915   Temp     Pulse 77 04/13/23 0915   Resp 14 04/13/23 0832   SpO2 98 % 04/13/23 0914   Vitals shown include unvalidated device data.    Electronically Signed By: FLASH BURTON CRNA  April 13, 2023  9:44 AM

## 2023-04-13 NOTE — H&P
PRE-PROCEDURE NOTE    CHIEFCOMPLAINT / REASON FOR PROCEDURE:  Screening for polyps and colorectal cancer.    PERTINENT HISTORY   Patient is due for colonoscopy. Previous colonoscopy - None. No  family history of colon polyps or colon cancer.    Past Medical History:   Diagnosis Date     Atypical mole     removed from face and L collarbone     Breast cancer screening, high risk patient      Family history of breast cancer in mother      Palpitations 2018    SVT on monitoring.  stress, caffiene - improved.         Past Surgical History:   Procedure Laterality Date     BIOPSY OF SKIN LESION      atypical on face and L collarbone     BREAST BIOPSY, RT/LT Left      HERNIA REPAIR, UMBILICAL N/A ~2000         Other:  None  Bleeding tendencies: No     Relevant Family History:  None     Relevant Social History:  None     10 point ROS of systems including Constitutional, Eyes, Respiratory, Cardiovascular, Gastroenterology, Genitourinary, Integumentary, Muscularskeletal, Psychiatric were all negative except for pertinent positives noted in my HPI.      ALLERGIES/SENSITIVITIES:   Allergies   Allergen Reactions     Augmentin Hives        CURRENT MEDICATIONS:    No current facility-administered medications on file prior to encounter.  Biotin 10 MG CAPS, Take 1 capsule by mouth daily  vitamin D3 (CHOLECALCIFEROL) 2000 units (50 mcg) tablet, Take 1 tablet (2,000 Units) by mouth daily            PRE-SEDATION ASSESSMENT:    LUNGS:  CTA B/L, no wheezing or crackles.  Heart & CV:  RRR no murmur.  Intact distal pulses, good cap refill.    Comment(s):      IMPRESSION: 48 year old female in need of screening colonoscopy.    PLAN:  I discussed screening colonoscopy with the patient. Anesthesia coverage requested.    Soy Smith MD    4/13/2023 7:55 AM

## 2023-04-13 NOTE — DISCHARGE INSTRUCTIONS
[facility]  Adult Discharge Orders & Instructions    ________________________________________________________________          For 12 hours after surgery  Get plenty of rest.  A responsible adult must stay with you for at least 12 hours after you leave the hospital.   You may feel lightheaded.  IF so, sit for a few minutes before standing.  Have someone help you get up.   You may have a slight fever. Call the doctor if your fever is over 101 F (38.3 C) (taken under the tongue) or lasts longer than 24 hours.  You may have a dry mouth, a sore throat, muscle aches or trouble sleeping.  These should go away after 24 hours.  Do not make important or legal decisions.  6.   Do not drive or use heavy equipment.  If you have weakness or tingling, don't drive or use heavy equipment until this feeling goes away.    To contact a doctor, call   [phone]_______________________       Your doctor recommends that you eat 25 to 30 Grams of fiber daily. The following are some examples of fiber amounts in different foods.    Fruits: Apple (with skin) 1 medium = 4.4 Grams   Banana      1 medium = 3.1 Grams   Oranges     1 orange = 3.1 Grams   Prunes     1 cup, pitted = 12.4 Grams    Juices: Apple, unsweetened w/ added ascorbic acid  1 cup = 0.5 Grams    Grapefruit, white, canned,sweetened  1 cup = 0.2 Grams    Grape, unsweetened w/ added ascorbic acid  1 cup = 0.5 Grams    Orange     1 cup = 0.7 Grams    Vegetables:   Cooked: Green Beans   1 cup = 4.0 Grams       Carrots   1/2 cup sliced = 2.3 Grams       Peas       1 cup = 8.8 Grams       Potato (baked, with skin)  1 medium = 3.8 Grams    Raw: Cucumber (with peel)  1 cucumber = 1.5 Grams            Lettuce     1 cup shredded = 0.5 Grams            Tomato   1 medium tomato = 1.5 Grams            Spinach  1 cup = 0.7 Grams    Legumes: Baked beans, canned, no salt added  1 cup = 13.9 Grams         Kidney Beans, canned  1 cup = 13.6 Grams         Rodrigues Beans, canned     1  cup = 11.6 Grams         Lentils, boiled   1 cup = 15.6 Grams    Breads, Pastas, Flours: Bran muffins   1 medium muffin = 5.2 Grams           Oatmeal, cooked  1 cup = 4.0 Grams           White Bread   1 slice = 0.6 Grams           Whole- wheat bread = 1.9 Grams    Pasta and rice, cooked: Macaroni  1 cup = 2.5 Grams           Rice, Brown  1 cup = 3.5 Grams           Rice, white   1 cup = 0.6 Grams           Spaghetti (regular) 1 cup = 2.5 Grams    Nuts: Almonds   1 cup = 17.4 Grams            Peanuts    1 cup = 12.4 Grams

## 2023-04-13 NOTE — ANESTHESIA CARE TRANSFER NOTE
Patient: Yesy Garg    Procedure: Procedure(s):  Colonoscopy       Diagnosis: Screening for colon cancer [Z12.11]  Diagnosis Additional Information: No value filed.    Anesthesia Type:   MAC     Note:    Oropharynx: oropharynx clear of all foreign objects  Level of Consciousness: awake  Oxygen Supplementation: room air    Independent Airway: airway patency satisfactory and stable    Vital Signs Stable: post-procedure vital signs reviewed and stable  Report to RN Given: handoff report given  Patient transferred to: Phase II    Handoff Report: Identifed the Patient, Identified the Reponsible Provider, Reviewed the pertinent medical history, Discussed the surgical course, Reviewed Intra-OP anesthesia mangement and issues during anesthesia, Set expectations for post-procedure period and Allowed opportunity for questions and acknowledgement of understanding      Vitals:  Vitals Value Taken Time   BP     Temp     Pulse     Resp     SpO2 96 % 04/13/23 0829   Vitals shown include unvalidated device data.    Electronically Signed By: FLASH BURTON CRNA  April 13, 2023  8:30 AM

## 2023-04-13 NOTE — OP NOTE
PROCEDURE NOTE    SURGEON:Soy Smith MD    PRE-OP DIAGNOSIS:  Screening Colonoscopy      POST-OP DIAGNOSIS: Normal     PROCEDURE: Colonoscopy    SPECIMEN:      * No specimens in log *    ANESTHESIA:  MAC CRNA Independent: Stefani Gutierres APRN CRNA   Coverage requested due    ESTIMATED BLOOD LOSS: none    COMPLICATIONS:  None    INDICATION FOR THE PROCEDURE: The patient is a 48 year old female. The patient presents with need for screening. I explained to the patient the risks, benefits and alternatives to screening colonoscopy for evaluating for cancer or polyps. We discussed the risks including bleeding, perforation, potential inability to reach the cecum and the risks of sedation. The patient's questions were answered and the patient wished to proceed. Informed consent paperwork was completed.    PROCEDURE: The patient was taken to the endoscopy suite. Appropriate monitors were attached. The patient was placed in the left lateral decubitus position. Timeout was performed confirming the patient's identity and procedure to be performed.  After appropriate sedation was confirmed, digital rectal exam was performed.  There was normal tone and no gross abnormality was noted.  The lubricated colonoscope was introduced into the anus the colon was insufflated with air. The prep quality was adequate. Under direct visualization the scope was advanced to the cecum. The mucosa of the colon was inspected while withdrawing the scope.  No abnormalities were noted. The scope was retroflexed in the rectum and the anorectal junction was inspected. No abnormalities were noted. The scope was returned to a neutral position and the colon was decompressed. The scope was removed. The patient tolerated the procedure with no immediately apparent complication. The patient was taken to recovery in stable condition.    FOLLOW UP: RECOMMEND high fiber diet. 10 yr follow up    Soy Smith MD on 4/13/2023 at 8:26 AM

## 2023-05-15 ENCOUNTER — LAB REQUISITION (OUTPATIENT)
Dept: LAB | Facility: OTHER | Age: 48
End: 2023-05-15

## 2023-05-15 DIAGNOSIS — Z11.52 ENCOUNTER FOR SCREENING FOR COVID-19: ICD-10-CM

## 2023-05-15 LAB — SARS-COV-2 RNA RESP QL NAA+PROBE: NEGATIVE

## 2023-05-15 PROCEDURE — 87635 SARS-COV-2 COVID-19 AMP PRB: CPT | Performed by: FAMILY MEDICINE

## 2023-08-11 ENCOUNTER — HOSPITAL ENCOUNTER (OUTPATIENT)
Dept: MAMMOGRAPHY | Facility: OTHER | Age: 48
Discharge: HOME | End: 2023-08-11
Attending: FAMILY MEDICINE | Admitting: FAMILY MEDICINE
Payer: COMMERCIAL

## 2023-08-11 DIAGNOSIS — Z12.31 VISIT FOR SCREENING MAMMOGRAM: ICD-10-CM

## 2023-08-11 PROCEDURE — 77067 SCR MAMMO BI INCL CAD: CPT

## 2023-09-13 ENCOUNTER — OFFICE VISIT (OUTPATIENT)
Dept: FAMILY MEDICINE | Facility: OTHER | Age: 48
End: 2023-09-13
Attending: FAMILY MEDICINE
Payer: COMMERCIAL

## 2023-09-13 VITALS
HEART RATE: 67 BPM | WEIGHT: 161 LBS | OXYGEN SATURATION: 98 % | TEMPERATURE: 98.2 F | RESPIRATION RATE: 16 BRPM | SYSTOLIC BLOOD PRESSURE: 100 MMHG | BODY MASS INDEX: 29.63 KG/M2 | DIASTOLIC BLOOD PRESSURE: 60 MMHG | HEIGHT: 62 IN

## 2023-09-13 DIAGNOSIS — N84.1 CERVICAL POLYP: ICD-10-CM

## 2023-09-13 DIAGNOSIS — Z12.4 CERVICAL CANCER SCREENING: ICD-10-CM

## 2023-09-13 DIAGNOSIS — R10.2 PELVIC PAIN IN FEMALE: ICD-10-CM

## 2023-09-13 DIAGNOSIS — Z91.89 AT HIGH RISK FOR BREAST CANCER: ICD-10-CM

## 2023-09-13 DIAGNOSIS — Z00.00 ROUTINE HISTORY AND PHYSICAL EXAMINATION OF ADULT: Primary | ICD-10-CM

## 2023-09-13 DIAGNOSIS — L91.0 KELOID SCAR: ICD-10-CM

## 2023-09-13 PROCEDURE — 99396 PREV VISIT EST AGE 40-64: CPT | Mod: 25 | Performed by: FAMILY MEDICINE

## 2023-09-13 PROCEDURE — 88305 TISSUE EXAM BY PATHOLOGIST: CPT

## 2023-09-13 PROCEDURE — G0123 SCREEN CERV/VAG THIN LAYER: HCPCS | Performed by: FAMILY MEDICINE

## 2023-09-13 PROCEDURE — 87624 HPV HI-RISK TYP POOLED RSLT: CPT | Mod: ZL | Performed by: FAMILY MEDICINE

## 2023-09-13 PROCEDURE — 57500 BIOPSY OF CERVIX: CPT | Performed by: FAMILY MEDICINE

## 2023-09-13 ASSESSMENT — PAIN SCALES - GENERAL: PAINLEVEL: NO PAIN (0)

## 2023-09-13 ASSESSMENT — ENCOUNTER SYMPTOMS
NERVOUS/ANXIOUS: 0
NAUSEA: 0
HEMATURIA: 0
DIZZINESS: 0
CONSTIPATION: 0
CHILLS: 0
HEMATOCHEZIA: 0
FEVER: 0
JOINT SWELLING: 0
ABDOMINAL PAIN: 0
PALPITATIONS: 0
DIARRHEA: 0
HEARTBURN: 0
SHORTNESS OF BREATH: 0
COUGH: 0
EYE PAIN: 0
PARESTHESIAS: 0
SORE THROAT: 0
HEADACHES: 0
MYALGIAS: 0
WEAKNESS: 0
BREAST MASS: 0
FREQUENCY: 0
DYSURIA: 0
ARTHRALGIAS: 0

## 2023-09-13 NOTE — NURSING NOTE
"Chief Complaint   Patient presents with    Physical       Initial /60   Pulse 67   Temp 98.2  F (36.8  C) (Temporal)   Resp 16   Ht 1.575 m (5' 2\")   Wt 73 kg (161 lb)   LMP 08/20/2023 (Exact Date)   SpO2 98%   BMI 29.45 kg/m   Estimated body mass index is 29.45 kg/m  as calculated from the following:    Height as of this encounter: 1.575 m (5' 2\").    Weight as of this encounter: 73 kg (161 lb).  Medication Reconciliation: complete    Yanni Puga LPN  Advanced Care Directive reviewed.       "

## 2023-09-13 NOTE — PROGRESS NOTES
"   SUBJECTIVE:   CC: Yesy is an 48 year old who presents for preventive health visit.       9/13/2023     3:26 PM   Additional Questions   Roomed by KALEE Liao   Accompanied by self       Healthy Habits:     Getting at least 3 servings of Calcium per day:  Yes    Bi-annual eye exam:  NO    Dental care twice a year:  Yes    Sleep apnea or symptoms of sleep apnea:  None    Diet:  Carbohydrate counting    Frequency of exercise:  None    Taking medications regularly:  Not Applicable    Medication side effects:  Not applicable    Additional concerns today:  No      Can feel like her  \"runs into something\" during intercourse.  Some discomfort, not bad.  No abnormal bleeding.   Periods are regular.  Pain/discomfort goes away afterward.  New over the last few months./ year?      Today's PHQ-2 Score:       9/13/2023     3:20 PM   PHQ-2 ( 1999 [employer])   Q1: Little interest or pleasure in doing things 0   Q2: Feeling down, depressed or hopeless 0   PHQ-2 Score 0   Q1: Little interest or pleasure in doing things Not at all   Q2: Feeling down, depressed or hopeless Not at all   PHQ-2 Score 0     Have you ever done Advance Care Planning? (For example, a Health Directive, POLST, or a discussion with a medical provider or your loved ones about your wishes): No, advance care planning information given to patient to review.  Advanced care planning was discussed at today's visit.    Social History     Tobacco Use    Smoking status: Former     Types: Cigarettes    Smokeless tobacco: Never   Substance Use Topics    Alcohol use: Yes     Comment: a couple drinks a week, more in the summer             9/13/2023     3:20 PM   Alcohol Use   Prescreen: >3 drinks/day or >7 drinks/week? No     Reviewed orders with patient.  Reviewed health maintenance and updated orders accordingly - Yes  BP Readings from Last 3 Encounters:   09/13/23 100/60   04/13/23 (!) 117/90   03/07/23 122/78    Wt Readings from Last 3 Encounters:   09/13/23 73 kg " (161 lb)   04/13/23 68.5 kg (151 lb)   03/07/23 71.8 kg (158 lb 3.2 oz)                  Patient Active Problem List   Diagnosis    At high risk for breast cancer    Palpitations     Past Surgical History:   Procedure Laterality Date    BIOPSY OF SKIN LESION      atypical on face and L collarbone    BREAST BIOPSY, RT/LT Left     COLONOSCOPY N/A 04/13/2023    Normal F/U 10 years    HERNIA REPAIR, UMBILICAL N/A ~2000       Social History     Tobacco Use    Smoking status: Former     Types: Cigarettes    Smokeless tobacco: Never   Substance Use Topics    Alcohol use: Yes     Comment: a couple drinks a week, more in the summer     Family History   Problem Relation Age of Onset    Breast Cancer Mother 34    Coronary Artery Disease Father         s/p stent and bipass surgery; started at age 50    No Known Problems Brother         doesn't get medical care often    Breast Cancer Maternal Grandmother 54        and at 66         Current Outpatient Medications   Medication Sig Dispense Refill    Biotin 10 MG CAPS Take 1 capsule by mouth daily      vitamin D3 (CHOLECALCIFEROL) 2000 units (50 mcg) tablet Take 1 tablet (2,000 Units) by mouth daily       Allergies   Allergen Reactions    Amoxicillin-Pot Clavulanate Hives       Breast Cancer Screening:    FHS-7:       8/1/2022     3:20 PM 8/11/2023     8:57 AM 9/13/2023     3:22 PM   Breast CA Risk Assessment (FHS-7)   Did any of your first-degree relatives have breast or ovarian cancer? Yes Yes Yes   Did any of your relatives have bilateral breast cancer? No No Yes   Did any man in your family have breast cancer? No No Yes   Did any woman in your family have breast and ovarian cancer? No No No   Did any woman in your family have breast cancer before age 50 y?  No Yes   Do you have 2 or more relatives with breast and/or ovarian cancer? Yes Yes Yes   Do you have 2 or more relatives with breast and/or bowel cancer? No No Yes     Pap: DUE  Mammo: follows high risk screening protocol  with Dr. Marshall.  Colon: 4/23, normal.  Dexa: NA/age.  Lung: some day vaping  Tdap 10/19/22, flu yearly, Shingrix @ 52, PNA @ [age].  Candidate for Covid.     Reviewed and updated as needed this visit by clinical staff   Tobacco  Allergies  Meds  Problems  Med Hx  Surg Hx  Fam Hx  Soc   Hx    Reviewed and updated as needed this visit by Provider   Tobacco  Allergies  Meds  Problems  Med Hx  Surg Hx  Fam Hx         Past Medical History:   Diagnosis Date    Atypical mole     removed from face and L collarbone    Breast cancer screening, high risk patient     Family history of breast cancer in mother     Palpitations 2018    SVT on monitoring.  stress, caffiene - improved.        Past Surgical History:   Procedure Laterality Date    BIOPSY OF SKIN LESION      atypical on face and L collarbone    BREAST BIOPSY, RT/LT Left     COLONOSCOPY N/A 04/13/2023    Normal F/U 10 years    HERNIA REPAIR, UMBILICAL N/A ~2000     OB History   No obstetric history on file.       Review of Systems   Constitutional:  Negative for chills and fever.   HENT:  Negative for congestion, ear pain, hearing loss and sore throat.    Eyes:  Negative for pain and visual disturbance.   Respiratory:  Negative for cough and shortness of breath.    Cardiovascular:  Negative for chest pain, palpitations and peripheral edema.   Gastrointestinal:  Negative for abdominal pain, constipation, diarrhea, heartburn, hematochezia and nausea.   Breasts:  Negative for tenderness, breast mass and discharge.   Genitourinary:  Positive for pelvic pain and urgency. Negative for dysuria, frequency, genital sores, hematuria, vaginal bleeding and vaginal discharge.   Musculoskeletal:  Negative for arthralgias, joint swelling and myalgias.   Skin:  Negative for rash.   Neurological:  Negative for dizziness, weakness, headaches and paresthesias.   Psychiatric/Behavioral:  Negative for mood changes. The patient is not nervous/anxious.         OBJECTIVE:   BP  "100/60   Pulse 67   Temp 98.2  F (36.8  C) (Temporal)   Resp 16   Ht 1.575 m (5' 2\")   Wt 73 kg (161 lb)   LMP 08/20/2023 (Exact Date)   SpO2 98%   BMI 29.45 kg/m    Physical Exam  GENERAL: healthy, alert and no distress  EYES: Eyes grossly normal to inspection, PERRL and conjunctivae and sclerae normal  HENT: ear canals and TM's normal, nose and mouth without ulcers or lesions  NECK: no adenopathy, no asymmetry, masses, or scars and thyroid normal to palpation  RESP: lungs clear to auscultation - no rales, rhonchi or wheezes  BREAST: normal without masses, tenderness or nipple discharge and no palpable axillary masses or adenopathy  CV: regular rate and rhythm, normal S1 S2, no S3 or S4, no murmur, click or rub, no peripheral edema and peripheral pulses strong  ABDOMEN: soft, nontender, no hepatosplenomegaly, no masses and bowel sounds normal   (female): normal female external genitalia, normal urethral meatus , vaginal mucosa pink, moist, well rugated, normal cervix, adnexae, and uterus without masses., and cervical polyp.  Polyp was grasped with a loop forceps, and with multiple twisting motions along with traction was removed easily in two parts.  Minimal bleeding noted.  MS: no gross musculoskeletal defects noted, no edema  SKIN: no suspicious lesions or rashes  NEURO: Normal strength and tone, mentation intact and speech normal  PSYCH: mentation appears normal, affect normal/bright    Diagnostic Test Results:  none   Pathology of cervical polyp pending.    ASSESSMENT/PLAN:   1. Routine history and physical examination of adult    2. Cervical cancer screening  Updated today.  - Pap Screen with HPV - recommended age 30 - 65 years  - HPV High Risk Types DNA Cervical    3. Pelvic pain in female  4. Cervical polyp  Discussed adequate lubrication, position changing to attempt improvement.  Did have cervical polyp noted on pelvic exam today and this was removed as above.  May also have been contributory due " "to her description in HPI of symptoms.  Await pathology.  Follow up if worsening.  - Surgical Pathology Exam    5. Keloid scar  Will continue to discuss with Dermatology - consider intralesional corticosteroid injections.    6. At high risk for breast cancer  Follows with protocol - mammo/MRI alternating every 6 months.        Patient has been advised of split billing requirements and indicates understanding: Yes      COUNSELING:  Reviewed preventive health counseling, as reflected in patient instructions      BMI:   Estimated body mass index is 29.45 kg/m  as calculated from the following:    Height as of this encounter: 1.575 m (5' 2\").    Weight as of this encounter: 73 kg (161 lb).   Weight management plan: Discussed healthy diet and exercise guidelines      She reports that she has quit smoking. Her smoking use included cigarettes. She has never used smokeless tobacco.          Fatoumata Puga, [facility]  "

## 2023-09-14 PROBLEM — S82.831A CLOSED FRACTURE OF DISTAL END OF RIGHT FIBULA: Status: RESOLVED | Noted: 2020-02-03 | Resolved: 2023-09-14

## 2023-09-14 ASSESSMENT — ENCOUNTER SYMPTOMS
DIARRHEA: 0
DIZZINESS: 0
BREAST MASS: 0
FREQUENCY: 0
SORE THROAT: 0
JOINT SWELLING: 0
ABDOMINAL PAIN: 0
PALPITATIONS: 0
HEMATOCHEZIA: 0
HEADACHES: 0
DYSURIA: 0
CHILLS: 0
ARTHRALGIAS: 0
NERVOUS/ANXIOUS: 0
COUGH: 0
HEMATURIA: 0
WEAKNESS: 0
CONSTIPATION: 0
FEVER: 0
EYE PAIN: 0
MYALGIAS: 0
HEARTBURN: 0
NAUSEA: 0
PARESTHESIAS: 0
SHORTNESS OF BREATH: 0

## 2023-09-15 LAB
BKR LAB AP GYN ADEQUACY: NORMAL
BKR LAB AP GYN INTERPRETATION: NORMAL
BKR LAB AP HPV REFLEX: NORMAL
BKR LAB AP PREVIOUS ABNORMAL: NORMAL
PATH REPORT.COMMENTS IMP SPEC: NORMAL
PATH REPORT.COMMENTS IMP SPEC: NORMAL
PATH REPORT.RELEVANT HX SPEC: NORMAL

## 2023-09-19 LAB
HUMAN PAPILLOMA VIRUS 16 DNA: NEGATIVE
HUMAN PAPILLOMA VIRUS 18 DNA: NEGATIVE
HUMAN PAPILLOMA VIRUS FINAL DIAGNOSIS: NORMAL
HUMAN PAPILLOMA VIRUS OTHER HR: NEGATIVE
PATH REPORT.COMMENTS IMP SPEC: NORMAL
PATH REPORT.FINAL DX SPEC: NORMAL
PHOTO IMAGE: NORMAL

## 2024-02-22 ENCOUNTER — HOSPITAL ENCOUNTER (OUTPATIENT)
Dept: MRI IMAGING | Facility: OTHER | Age: 49
Discharge: HOME | End: 2024-02-22
Attending: SURGERY | Admitting: SURGERY
Payer: COMMERCIAL

## 2024-02-22 DIAGNOSIS — R92.30 DENSE BREAST: ICD-10-CM

## 2024-02-22 DIAGNOSIS — Z91.89 AT HIGH RISK FOR BREAST CANCER: ICD-10-CM

## 2024-02-22 DIAGNOSIS — Z12.39 SCREENING FOR BREAST CANCER USING NON-MAMMOGRAM MODALITY: ICD-10-CM

## 2024-02-22 DIAGNOSIS — Z80.3 FAMILY HISTORY OF BREAST CANCER: ICD-10-CM

## 2024-02-22 PROCEDURE — 77049 MRI BREAST C-+ W/CAD BI: CPT

## 2024-02-22 PROCEDURE — 255N000002 HC RX 255 OP 636: Mod: JZ | Performed by: SURGERY

## 2024-02-22 PROCEDURE — A9575 INJ GADOTERATE MEGLUMI 0.1ML: HCPCS | Mod: JZ | Performed by: SURGERY

## 2024-02-22 RX ORDER — GADOTERATE MEGLUMINE 376.9 MG/ML
15 INJECTION INTRAVENOUS ONCE
Status: COMPLETED | OUTPATIENT
Start: 2024-02-22 | End: 2024-02-22

## 2024-02-22 RX ADMIN — GADOTERATE MEGLUMINE 15 ML: 376.9 INJECTION INTRAVENOUS at 07:37

## 2024-04-02 ENCOUNTER — LAB REQUISITION (OUTPATIENT)
Dept: LAB | Facility: OTHER | Age: 49
End: 2024-04-02

## 2024-04-02 DIAGNOSIS — Z11.52 ENCOUNTER FOR SCREENING FOR COVID-19: ICD-10-CM

## 2024-04-02 LAB — SARS-COV-2 RNA RESP QL NAA+PROBE: NEGATIVE

## 2024-04-02 PROCEDURE — 87635 SARS-COV-2 COVID-19 AMP PRB: CPT | Performed by: FAMILY MEDICINE

## 2024-04-03 ENCOUNTER — OFFICE VISIT (OUTPATIENT)
Dept: FAMILY MEDICINE | Facility: OTHER | Age: 49
End: 2024-04-03
Attending: NURSE PRACTITIONER
Payer: COMMERCIAL

## 2024-04-03 VITALS
HEART RATE: 105 BPM | RESPIRATION RATE: 18 BRPM | BODY MASS INDEX: 29.44 KG/M2 | TEMPERATURE: 100.8 F | DIASTOLIC BLOOD PRESSURE: 60 MMHG | SYSTOLIC BLOOD PRESSURE: 104 MMHG | WEIGHT: 160 LBS | OXYGEN SATURATION: 96 % | HEIGHT: 62 IN

## 2024-04-03 DIAGNOSIS — R50.9 LOW GRADE FEVER: ICD-10-CM

## 2024-04-03 DIAGNOSIS — J02.9 SORE THROAT: ICD-10-CM

## 2024-04-03 DIAGNOSIS — J11.1 INFLUENZA-LIKE ILLNESS: Primary | ICD-10-CM

## 2024-04-03 LAB
FLUAV RNA SPEC QL NAA+PROBE: NEGATIVE
FLUBV RNA RESP QL NAA+PROBE: NEGATIVE
GROUP A STREP BY PCR: NOT DETECTED
RSV RNA SPEC NAA+PROBE: NEGATIVE
SARS-COV-2 RNA RESP QL NAA+PROBE: NEGATIVE

## 2024-04-03 PROCEDURE — 99213 OFFICE O/P EST LOW 20 MIN: CPT | Performed by: NURSE PRACTITIONER

## 2024-04-03 PROCEDURE — 87637 SARSCOV2&INF A&B&RSV AMP PRB: CPT | Mod: ZL | Performed by: NURSE PRACTITIONER

## 2024-04-03 PROCEDURE — 87651 STREP A DNA AMP PROBE: CPT | Mod: ZL | Performed by: NURSE PRACTITIONER

## 2024-04-03 ASSESSMENT — PAIN SCALES - GENERAL: PAINLEVEL: MODERATE PAIN (4)

## 2024-04-03 NOTE — NURSING NOTE
"Chief Complaint   Patient presents with    Fever    Pharyngitis    Nasal Congestion     Patient here for sore throat, fever, and nasal congestion x2 days.    Initial /60   Pulse 105   Temp (!) 100.8  F (38.2  C) (Tympanic)   Resp 18   Ht 1.575 m (5' 2\")   Wt 72.6 kg (160 lb)   LMP 03/14/2024 (Approximate)   SpO2 96%   BMI 29.26 kg/m   Estimated body mass index is 29.26 kg/m  as calculated from the following:    Height as of this encounter: 1.575 m (5' 2\").    Weight as of this encounter: 72.6 kg (160 lb).  Medication Review: complete    The next two questions are to help us understand your food security.  If you are feeling you need any assistance in this area, we have resources available to support you today.          4/3/2024   SDOH- Food Insecurity   Within the past 12 months, did you worry that your food would run out before you got money to buy more? N   Within the past 12 months, did the food you bought just not last and you didn t have money to get more? N         Health Care Directive:  Patient does not have a Health Care Directive or Living Will: Discussed advance care planning with patient; however, patient declined at this time.    Shaye Cervantes LPN      "

## 2024-06-04 ENCOUNTER — TELEPHONE (OUTPATIENT)
Dept: FAMILY MEDICINE | Facility: OTHER | Age: 49
End: 2024-06-04
Payer: COMMERCIAL

## 2024-06-04 DIAGNOSIS — Z12.83 SCREENING EXAM FOR SKIN CANCER: Primary | ICD-10-CM

## 2024-06-04 NOTE — TELEPHONE ENCOUNTER
Patient went to dermatology professionals and they are requesting an insurance referral be sent to them  Leny Nava on 6/4/2024 at 2:21 PM

## 2024-06-04 NOTE — TELEPHONE ENCOUNTER
Left message to call back....................  6/4/2024   3:17 PM   Need to ask what the date of service was, who she saw and reason for the visit.  Yanni Puga, KALEE   6/4/2024  3:18 PM

## 2024-06-04 NOTE — TELEPHONE ENCOUNTER
Patient called back and she was seen today for an overall skin check. Referral is pending.  Yanni Puga LPN  6/4/2024  3:30 PM

## 2024-08-28 ENCOUNTER — HOSPITAL ENCOUNTER (OUTPATIENT)
Dept: MAMMOGRAPHY | Facility: OTHER | Age: 49
Discharge: HOME | End: 2024-08-28
Attending: FAMILY MEDICINE | Admitting: FAMILY MEDICINE
Payer: COMMERCIAL

## 2024-08-28 DIAGNOSIS — Z12.31 VISIT FOR SCREENING MAMMOGRAM: ICD-10-CM

## 2024-08-28 PROCEDURE — 77063 BREAST TOMOSYNTHESIS BI: CPT

## 2024-12-01 ENCOUNTER — HEALTH MAINTENANCE LETTER (OUTPATIENT)
Age: 49
End: 2024-12-01

## 2024-12-30 ENCOUNTER — HOSPITAL ENCOUNTER (OUTPATIENT)
Dept: GENERAL RADIOLOGY | Facility: OTHER | Age: 49
Discharge: HOME | End: 2024-12-30

## 2024-12-30 ENCOUNTER — OFFICE VISIT (OUTPATIENT)
Dept: FAMILY MEDICINE | Facility: OTHER | Age: 49
End: 2024-12-30
Attending: NURSE PRACTITIONER

## 2024-12-30 VITALS
DIASTOLIC BLOOD PRESSURE: 70 MMHG | WEIGHT: 166 LBS | OXYGEN SATURATION: 98 % | SYSTOLIC BLOOD PRESSURE: 128 MMHG | RESPIRATION RATE: 16 BRPM | BODY MASS INDEX: 30.36 KG/M2 | TEMPERATURE: 98.6 F | HEART RATE: 76 BPM

## 2024-12-30 DIAGNOSIS — S69.91XA INJURY OF RIGHT WRIST, INITIAL ENCOUNTER: Primary | ICD-10-CM

## 2024-12-30 PROCEDURE — 73110 X-RAY EXAM OF WRIST: CPT | Mod: RT

## 2024-12-30 ASSESSMENT — ENCOUNTER SYMPTOMS
JOINT SWELLING: 1
CONSTITUTIONAL NEGATIVE: 1
ARTHRALGIAS: 1

## 2024-12-30 ASSESSMENT — PAIN SCALES - GENERAL: PAINLEVEL_OUTOF10: SEVERE PAIN (7)

## 2024-12-30 NOTE — PROGRESS NOTES
Yesy Garg  1975    ASSESSMENT/PLAN    1. Injury of right wrist, initial encounter (Primary)  - XR Wrist Right G/E 3 Views      Right wrist x-ray completed  Per radiology: No evidence of fracture or dislocation.  -Differentials include bone contusion versus wrist sprain  -Recommended Ace wrap, rest, and ice.  -Recommend ibuprofen and Tylenol as needed for pain.  -Follow up as needed for new or worsening symptoms        *Explanation of diagnosis, treatment options and risk and benefits of medications reviewed with patient. Patient agrees with plan of care.  *All questions were answered.    *Red flags symptoms were discussed and patient was advised when they should return for reevaluation or for prompt emergency evaluation.   *Patient was given verbal and written instructions on plan of care. Instructions were printed or are available on [employer] on electronic AVS.   *We discussed potential side effects of any prescribed or recommended therapies, as well as expectations for response to treatments.  *Patient discharged in stable condition    Kermit Spencer, MANAV, APRN, FNP-C  [facility]    SUBJECTIVE  CHIEF COMPLAINT/ REASON FOR VISIT  Patient presents with:  Work Comp: Right wrist injury     HISTORY OF PRESENT ILLNESS  Yesy Garg is a pleasant 49 year old female presents to [facility] today with right wrist injury.  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  Since Friday patient has been dealing with pain, swelling, and bruising to the right posterior wrist.  She is finding it difficult to perform various tasks related to her job which include IV insertion, hanging IV fluids, etc.    History provided by patient    I have reviewed the nursing notes.  I have reviewed allergies, medication list, problem list, and past medical history.    REVIEW OF SYSTEMS  Review of  Systems   Constitutional: Negative.    HENT: Negative.     Musculoskeletal:  Positive for arthralgias and joint swelling.   Skin: Negative.         VITAL SIGNS  Vitals:    12/30/24 1229   BP: 128/70   BP Location: Left arm   Patient Position: Sitting   Cuff Size: Adult Regular   Pulse: 76   Resp: 16   Temp: 98.6  F (37  C)   TempSrc: Tympanic   SpO2: 98%   Weight: 75.3 kg (166 lb)      Body mass index is 30.36 kg/m .      OBJECTIVE  PHYSICAL EXAM  Physical Exam  Vitals and nursing note reviewed.   Constitutional:       General: She is not in acute distress.     Appearance: Normal appearance. She is normal weight. She is not ill-appearing, toxic-appearing or diaphoretic.   Musculoskeletal:      Right wrist: Swelling and tenderness present. No deformity or snuff box tenderness. Normal range of motion.      Left wrist: Normal.      Right hand: Normal.      Left hand: Normal.   Skin:     General: Skin is warm and dry.      Findings: Bruising present. No erythema or rash.   Neurological:      Mental Status: She is alert.            DIAGNOSTICS  Results for orders placed or performed in visit on 12/30/24   XR Wrist Right G/E 3 Views     Status: None    Narrative    XR WRIST RIGHT G/E 3 VIEWS    HISTORY: 49 years Female Hit wrist on wall, difficulty doing various  tasks without pain, rule out fracture.; Injury of right wrist, initial  encounter    COMPARISON: None    TECHNIQUE: Right breast 3 views    FINDINGS: Joint spaces are congruent. Articular surfaces are smooth.  There is no evidence of fracture or dislocation.      Impression    IMPRESSION: No evidence of fracture or dislocation.    NADIR MCCAIN MD         SYSTEM ID:  [id number]

## 2024-12-30 NOTE — NURSING NOTE
Pt here for work comp right wrist injury on 12/27/24.  Pt ran into the wall.  It felt tender all weekend but was able to use it.  Late last night it really started to hurt and now today can hardly use it.  Joselyn Mcneil CMA ([facility])......................12/30/2024  12:28 PM       Medication Reconciliation: complete    Joselyn Mcneil CMA  12/30/2024 12:28 PM      FOOD SECURITY SCREENING QUESTIONS:    The next two questions are to help us understand your food security.  If you are feeling you need any assistance in this area, we have resources available to support you today.    Hunger Vital Signs:  Within the past 12 months we worried whether our food would run out before we got money to buy more. Never  Within the past 12 months the food we bought just didn't last and we didn't have money to get more. Never  Joselyn Mcneil CMA,LPN on 12/30/2024 at 12:28 PM

## 2025-01-14 ENCOUNTER — LAB REQUISITION (OUTPATIENT)
Dept: LAB | Facility: OTHER | Age: 50
End: 2025-01-14

## 2025-01-14 LAB — SARS-COV-2 RNA RESP QL NAA+PROBE: NEGATIVE

## 2025-01-14 PROCEDURE — 87635 SARS-COV-2 COVID-19 AMP PRB: CPT | Performed by: FAMILY MEDICINE

## 2025-03-12 ENCOUNTER — HOSPITAL ENCOUNTER (OUTPATIENT)
Dept: MRI IMAGING | Facility: OTHER | Age: 50
Discharge: HOME | End: 2025-03-12
Attending: SURGERY
Payer: COMMERCIAL

## 2025-03-12 DIAGNOSIS — Z80.3 FAMILY HISTORY OF BREAST CANCER: ICD-10-CM

## 2025-03-12 DIAGNOSIS — Z91.89 AT HIGH RISK FOR BREAST CANCER: ICD-10-CM

## 2025-03-12 DIAGNOSIS — Z12.39 ENCOUNTER FOR BREAST CANCER SCREENING USING NON-MAMMOGRAM MODALITY: ICD-10-CM

## 2025-03-12 PROCEDURE — A9575 INJ GADOTERATE MEGLUMI 0.1ML: HCPCS | Performed by: SURGERY

## 2025-03-12 PROCEDURE — 255N000002 HC RX 255 OP 636: Performed by: SURGERY

## 2025-03-12 PROCEDURE — 77049 MRI BREAST C-+ W/CAD BI: CPT

## 2025-03-12 RX ORDER — GADOTERATE MEGLUMINE 376.9 MG/ML
15 INJECTION INTRAVENOUS ONCE
Status: COMPLETED | OUTPATIENT
Start: 2025-03-12 | End: 2025-03-12

## 2025-03-12 RX ADMIN — GADOTERATE MEGLUMINE 15 ML: 376.9 INJECTION INTRAVENOUS at 09:16

## 2025-06-03 ENCOUNTER — MYC MEDICAL ADVICE (OUTPATIENT)
Dept: FAMILY MEDICINE | Facility: OTHER | Age: 50
End: 2025-06-03
Payer: COMMERCIAL

## 2025-06-03 DIAGNOSIS — Z12.83 SCREENING FOR SKIN CANCER: Primary | ICD-10-CM

## 2025-06-03 NOTE — TELEPHONE ENCOUNTER
Needs referral for yearly dermatology appt.     Devon'd up referral.     Routing to provider to review and respond.  Lavinia King RN on 6/3/2025 at 10:30 AM

## 2025-06-03 NOTE — TELEPHONE ENCOUNTER
Nnamdi Lozano,    I have signed your dermatology referral.     Sincerely,   Renee Cameron NP on 6/3/2025 at 11:56 AM

## (undated) DEVICE — TUBING SUCTION 10'X3/16" N510

## (undated) DEVICE — ENDO BRUSH CHANNEL MASTER CLEANING 2-4.2MM BW-412T

## (undated) DEVICE — SOL WATER 1500ML

## (undated) DEVICE — SUCTION MANIFOLD NEPTUNE 2 SYS 4 PORT 0702-020-000

## (undated) DEVICE — ENDO KIT COMPLIANCE DYKENDOCMPLY

## (undated) RX ORDER — PROPOFOL 10 MG/ML
INJECTION, EMULSION INTRAVENOUS
Status: DISPENSED
Start: 2023-04-13

## (undated) RX ORDER — TRIAMCINOLONE ACETONIDE 40 MG/ML
INJECTION, SUSPENSION INTRA-ARTICULAR; INTRAMUSCULAR
Status: DISPENSED
Start: 2022-02-17

## (undated) RX ORDER — LIDOCAINE HYDROCHLORIDE 10 MG/ML
INJECTION, SOLUTION EPIDURAL; INFILTRATION; INTRACAUDAL; PERINEURAL
Status: DISPENSED
Start: 2022-02-17